# Patient Record
Sex: MALE | Race: WHITE | NOT HISPANIC OR LATINO | Employment: FULL TIME | URBAN - NONMETROPOLITAN AREA
[De-identification: names, ages, dates, MRNs, and addresses within clinical notes are randomized per-mention and may not be internally consistent; named-entity substitution may affect disease eponyms.]

---

## 2017-08-28 ENCOUNTER — TRANSFERRED RECORDS (OUTPATIENT)
Dept: HEALTH INFORMATION MANAGEMENT | Facility: HOSPITAL | Age: 27
End: 2017-08-28

## 2017-08-28 ENCOUNTER — MEDICAL CORRESPONDENCE (OUTPATIENT)
Dept: HEALTH INFORMATION MANAGEMENT | Facility: HOSPITAL | Age: 27
End: 2017-08-28

## 2017-09-06 DIAGNOSIS — H91.93 DECREASED HEARING, BILATERAL: Primary | ICD-10-CM

## 2017-09-15 ENCOUNTER — OFFICE VISIT (OUTPATIENT)
Dept: OTOLARYNGOLOGY | Facility: OTHER | Age: 27
End: 2017-09-15
Attending: PHYSICIAN ASSISTANT
Payer: COMMERCIAL

## 2017-09-15 ENCOUNTER — OFFICE VISIT (OUTPATIENT)
Dept: AUDIOLOGY | Facility: OTHER | Age: 27
End: 2017-09-15
Attending: AUDIOLOGIST
Payer: COMMERCIAL

## 2017-09-15 VITALS
TEMPERATURE: 97 F | HEART RATE: 87 BPM | SYSTOLIC BLOOD PRESSURE: 118 MMHG | DIASTOLIC BLOOD PRESSURE: 74 MMHG | BODY MASS INDEX: 35.89 KG/M2 | HEIGHT: 72 IN | WEIGHT: 265 LBS

## 2017-09-15 DIAGNOSIS — H69.91 DYSFUNCTION OF EUSTACHIAN TUBE, RIGHT: ICD-10-CM

## 2017-09-15 DIAGNOSIS — H90.11 CONDUCTIVE HEARING LOSS OF RIGHT EAR WITH UNRESTRICTED HEARING OF LEFT EAR: ICD-10-CM

## 2017-09-15 DIAGNOSIS — H65.91 RIGHT SEROUS OTITIS MEDIA, UNSPECIFIED CHRONICITY: Primary | ICD-10-CM

## 2017-09-15 DIAGNOSIS — Z86.69 HISTORY OF PERFORATION OF TYMPANIC MEMBRANE: ICD-10-CM

## 2017-09-15 DIAGNOSIS — H69.92 DYSFUNCTION OF EUSTACHIAN TUBE, LEFT: ICD-10-CM

## 2017-09-15 DIAGNOSIS — H90.2 CONDUCTIVE HEARING LOSS, UNILATERAL: ICD-10-CM

## 2017-09-15 DIAGNOSIS — H90.5 SENSORINEURAL HEARING LOSS (SNHL) OF LEFT EAR, UNSPECIFIED HEARING STATUS ON CONTRALATERAL SIDE: ICD-10-CM

## 2017-09-15 PROCEDURE — 99214 OFFICE O/P EST MOD 30 MIN: CPT | Mod: 25 | Performed by: PHYSICIAN ASSISTANT

## 2017-09-15 PROCEDURE — 92567 TYMPANOMETRY: CPT | Performed by: AUDIOLOGIST

## 2017-09-15 PROCEDURE — 92504 EAR MICROSCOPY EXAMINATION: CPT | Performed by: PHYSICIAN ASSISTANT

## 2017-09-15 PROCEDURE — 92557 COMPREHENSIVE HEARING TEST: CPT | Performed by: AUDIOLOGIST

## 2017-09-15 RX ORDER — LORATADINE 10 MG/1
10 TABLET ORAL DAILY
Qty: 30 TABLET | Refills: 1 | Status: SHIPPED | OUTPATIENT
Start: 2017-09-15 | End: 2017-11-22

## 2017-09-15 RX ORDER — FLUTICASONE PROPIONATE 50 MCG
SPRAY, SUSPENSION (ML) NASAL
Qty: 2 BOTTLE | Refills: 11 | Status: SHIPPED | OUTPATIENT
Start: 2017-09-15 | End: 2017-11-22

## 2017-09-15 ASSESSMENT — PAIN SCALES - GENERAL: PAINLEVEL: NO PAIN (0)

## 2017-09-15 NOTE — MR AVS SNAPSHOT
"              After Visit Summary   9/15/2017    Sudarshan [place]el[place]    MRN: [id number]           Patient Information     Date Of Birth          1990        Visit Information        Provider Department      9/15/2017 9:00 AM [facility]        Today's Diagnoses     Dysfunction of eustachian tube, left        Conductive hearing loss, unilateral           Follow-ups after your visit        Your next 10 appointments already scheduled     Sep 15, 2017  9:30 AM CDT   (Arrive by 9:15 AM)   New Visit with Sandra Farr PA-C   [facility] [place] ([facility] )    [address]  [place] [address]   [phone]              Who to contact     If you have questions or need follow up information about today's clinic visit or your schedule please contact [facility] directly at [phone].  Normal or non-critical lab and imaging results will be communicated to you by MyChart, letter or phone within 4 business days after the clinic has received the results. If you do not hear from us within 7 days, please contact the clinic through MyChart or phone. If you have a critical or abnormal lab result, we will notify you by phone as soon as possible.  Submit refill requests through Erydel or call your pharmacy and they will forward the refill request to us. Please allow 3 business days for your refill to be completed.          Additional Information About Your Visit        MyChart Information     Erydel lets you send messages to your doctor, view your test results, renew your prescriptions, schedule appointments and more. To sign up, go to [email or web address] . Click on \"Log in\" on the left side of the screen, which will take you to the Welcome page. Then click on \"Sign up Now\" on the right side of the page.     You will be asked to enter the access code listed below, as well as some personal information. Please follow the directions to create your " username and password.     Your access code is: [email or web address]  Expires: 2017  9:04 AM     Your access code will  in 90 days. If you need help or a new code, please call your [place] clinic or [phone].        Care EveryWhere ID     This is your Care EveryWhere ID. This could be used by other organizations to access your [place] medical records  [id number]         Blood Pressure from Last 3 Encounters:   No data found for BP    Weight from Last 3 Encounters:   No data found for Wt              We Performed the Following     AUDIOGRAM/TYMPANOGRAM - INTERFACE        Primary Care Provider    None Specified       No primary provider on file.        Equal Access to Services     [facility]: Hadii rafi Chavez, drarell kong, estefani kaalmada zahira, jj bucio . So [facility] [phone].    ATENCIÓN: Si habla español, tiene a davalos disposición servicios gratuitos de asistencia lingüística. Llame al [phone].    We comply with applicable federal civil rights laws and [place] laws. We do not discriminate on the basis of race, color, national origin, age, disability sex, sexual orientation or gender identity.            Thank you!     Thank you for choosing [facility]  for your care. Our goal is always to provide you with excellent care. Hearing back from our patients is one way we can continue to improve our services. Please take a few minutes to complete the written survey that you may receive in the mail after your visit with us. Thank you!             Your Updated Medication List - Protect others around you: Learn how to safely use, store and throw away your medicines at www.disposemymeds.org.      Notice  As of 9/15/2017  9:04 AM    You have not been prescribed any medications.

## 2017-09-15 NOTE — PROGRESS NOTES
Audiology Evaluation Completed. Please refer SCANNED AUDIOGRAM and/or TYMPANOGRAM for BACKGROUND, RESULTS, RECOMMENDATIONS.    UNDER RECOMMENDATIONS ON AUDIOGRAM PATIENT REFERRED TO ENT WITH SYMPTOMS      La Nena COLVIN, [facility]  Audiologist #[id number]        NO EPIC REFERRAL/ORDER NEEDED TO ENT BY AUDIOLOGY AS PATIENT ALREADY HAS AN APPOINTMENT WITH ENT

## 2017-09-15 NOTE — MR AVS SNAPSHOT
"              After Visit Summary   9/15/2017    Sudarshan Skelton    MRN: [id number]           Patient Information     Date Of Birth          1990        Visit Information        Provider Department      9/15/2017 9:30 AM Sandra Farr PA-C [facility]        Today's Diagnoses     Right serous otitis media, unspecified chronicity    -  1    Dysfunction of eustachian tube, right        History of perforation of tympanic membrane          Care Instructions    Start Flonase 2 sprays to each nostril twice a day for 2 weeks, then use daily  Start daily Claritin one tablet at night  Follow up in 3-4 weeks for recheck of right ear.   If effusion (fluid) or retraction remains, consider tube placement.     If there are concerns or questions, Call [phone] and ask for nurse           Follow-ups after your visit        Who to contact     If you have questions or need follow up information about today's clinic visit or your schedule please contact [facility] directly at [phone].  Normal or non-critical lab and imaging results will be communicated to you by Aditazzhart, letter or phone within 4 business days after the clinic has received the results. If you do not hear from us within 7 days, please contact the clinic through Plumzit or phone. If you have a critical or abnormal lab result, we will notify you by phone as soon as possible.  Submit refill requests through Vostu or call your pharmacy and they will forward the refill request to us. Please allow 3 business days for your refill to be completed.          Additional Information About Your Visit        AditazzharConfortVisuel Information     Vostu lets you send messages to your doctor, view your test results, renew your prescriptions, schedule appointments and more. To sign up, go to [email or web address] . Click on \"Log in\" on the left side of the screen, which will take you to the Welcome page. Then click on \"Sign up Now\" on the right side of the page. "     You will be asked to enter the access code listed below, as well as some personal information. Please follow the directions to create your username and password.     Your access code is: [email or web address]  Expires: 2017  9:04 AM     Your access code will  in 90 days. If you need help or a new code, please call your [place] clinic or [phone].        Care EveryWhere ID     This is your Care EveryWhere ID. This could be used by other organizations to access your [place] medical records  [id number]        Your Vitals Were     Pulse Temperature Height BMI (Body Mass Index)          87 97  F (36.1  C) (Tympanic) 6' (1.829 m) 35.94 kg/m2         Blood Pressure from Last 3 Encounters:   09/15/17 118/74    Weight from Last 3 Encounters:   09/15/17 265 lb (120.2 kg)              Today, you had the following     No orders found for display         Today's Medication Changes          These changes are accurate as of: 9/15/17  9:55 AM.  If you have any questions, ask your nurse or doctor.               Start taking these medicines.        Dose/Directions    fluticasone 50 MCG/ACT spray   Commonly known as:  FLONASE   Used for:  Right serous otitis media, unspecified chronicity, Dysfunction of eustachian tube, right, History of perforation of tympanic membrane   Started by:  Sandra Farr PA-C        Use 2 sprays to each nostril twice a day for 2 weeks, then daily.   Quantity:  2 Bottle   Refills:  11       loratadine 10 MG tablet   Commonly known as:  CLARITIN   Used for:  Right serous otitis media, unspecified chronicity, Dysfunction of eustachian tube, right, History of perforation of tympanic membrane   Started by:  Sandra Farr PA-C        Dose:  10 mg   Take 1 tablet (10 mg) by mouth daily   Quantity:  30 tablet   Refills:  1            Where to get your medicines      These medications were sent to [id number] - [place] - [address] [address]     Phone:  [phone]      fluticasone 50 MCG/ACT spray    loratadine 10 MG tablet                Primary Care Provider    None Specified       No primary provider on file.        Equal Access to Services     [facility] : Hadii aad ku hadzeniacatia Kathy, evetteda natalicrystalha, estefani rodriges, jj donnellin hayaaharshad dietrichaundrea yusuf rupinder powell. So [facility] [phone].    ATENCIÓN: Si habla español, tiene a davalos disposición servicios gratuitos de asistencia lingüística. Llame al [phone].    We comply with applicable federal civil rights laws and [place] laws. We do not discriminate on the basis of race, color, national origin, age, disability sex, sexual orientation or gender identity.            Thank you!     Thank you for choosing [facility]  for your care. Our goal is always to provide you with excellent care. Hearing back from our patients is one way we can continue to improve our services. Please take a few minutes to complete the written survey that you may receive in the mail after your visit with us. Thank you!             Your Updated Medication List - Protect others around you: Learn how to safely use, store and throw away your medicines at www.disposemymeds.org.          This list is accurate as of: 9/15/17  9:55 AM.  Always use your most recent med list.                   Brand Name Dispense Instructions for use Diagnosis    fluticasone 50 MCG/ACT spray    FLONASE    2 Bottle    Use 2 sprays to each nostril twice a day for 2 weeks, then daily.    Right serous otitis media, unspecified chronicity, Dysfunction of eustachian tube, right, History of perforation of tympanic membrane       loratadine 10 MG tablet    CLARITIN    30 tablet    Take 1 tablet (10 mg) by mouth daily    Right serous otitis media, unspecified chronicity, Dysfunction of eustachian tube, right, History of perforation of tympanic membrane

## 2017-09-15 NOTE — NURSING NOTE
Chief Complaint   Patient presents with     Consult     Hearing Loss, Tympanic Membrane Perforation; Referred by Regina       Initial /74 (Cuff Size: Adult Large)  Pulse 87  Temp 97  F (36.1  C) (Tympanic)  Ht 6' (1.829 m)  Wt 265 lb (120.2 kg)  BMI 35.94 kg/m2 Estimated body mass index is 35.94 kg/(m^2) as calculated from the following:    Height as of this encounter: 6' (1.829 m).    Weight as of this encounter: 265 lb (120.2 kg).  Medication Reconciliation: leelee Irwin

## 2017-09-15 NOTE — PROGRESS NOTES
Chief Complaint   Patient presents with     Consult     Hearing Loss, Tympanic Membrane Perforation; Referred by Regina     Sudarshan was on his honeymoon in [place], and was estela jumping. He was jumping multiple times, and noted increase in sensation with water feeling his ears. He went to [place], but his otorrhea stopped.   His hearing had not improved since the onset.   He was seen by his PCP in [place], and noted 2/3 perforation at his last visit.     He had COM as a child with BTT.   No recent OM.   He has no otalgia, otorrhea.   He has decreased hearing right. Left hearing is normal.   Sudarshan denies tinnitus.   Denies vertigo.   He has been keeping his ear dry.     Past Medical History:   Diagnosis Date     Exercise-induced asthma 2001      Not on File  No current outpatient prescriptions on file.     No current facility-administered medications for this visit.       ROS: 10 point ROS neg other than the symptoms noted above in the HPI.  /74 (Cuff Size: Adult Large)  Pulse 87  Temp 97  F (36.1  C) (Tympanic)  Ht 6' (1.829 m)  Wt 265 lb (120.2 kg)  BMI 35.94 kg/m2    General - The patient is well nourished and well developed, and appears to have good nutritional status.  Alert and oriented to person and place, answers questions and cooperates with examination appropriately.   Head and Face - Normocephalic and atraumatic, with no gross asymmetry noted.  The facial nerve is intact, with strong symmetric movements.  Voice and Breathing - The patient was breathing comfortably without the use of accessory muscles. There was no wheezing, stridor, or stertor.  The patients voice was clear and strong, and had appropriate pitch and quality.  Ears -The external auditory canals are patent, the tympanic membranes are intact. Left TM appears intact without effusion. Right TM does appear intact with central, anterior superior retraction. Air fluid level noted, serous.   Ears were examined under microscopy.   I do not  visualize a perforation  Eyes - Extraocular movements intact, and the pupils were reactive to light.  Sclera were not icteric or injected, conjunctiva were pink and moist.  Mouth - Examination of the oral cavity showed pink, healthy oral mucosa. No lesions or ulcerations noted.  The tongue was mobile and midline, and the dentition were in good condition.    Throat - The walls of the oropharynx were smooth, pink, moist, symmetric, and had no lesions or ulcerations.  The tonsillar pillars and soft palate were symmetric.  The uvula was midline on elevation.    Neck - Normal midline excursion of the laryngotracheal complex during swallowing.  Full range of motion on passive movement.  Palpation of the occipital, submental, submandibular, internal jugular chain, and supraclavicular nodes did not demonstrate any abnormal lymph nodes or masses.  Palpation of the thyroid was soft and smooth, with no nodules or goiter appreciated.  The trachea was mobile and midline.  Nose - External contour is symmetric, no gross deflection or scars.  Nasal mucosa is pink and moist with no abnormal mucus.     Audiogram  Type A left  Type B low volume left  Thresholds are mild rising to normal sloping to mild notch at 6000 Hz primarily SNHL left. Right moderate to moderate severe conductive hearing loss.       ASSESSMENT:    ICD-10-CM    1. Right serous otitis media, unspecified chronicity H65.91 fluticasone (FLONASE) 50 MCG/ACT spray     loratadine (CLARITIN) 10 MG tablet   2. Dysfunction of eustachian tube, right H69.81 fluticasone (FLONASE) 50 MCG/ACT spray     loratadine (CLARITIN) 10 MG tablet   3. History of perforation of tympanic membrane Z86.69 fluticasone (FLONASE) 50 MCG/ACT spray     loratadine (CLARITIN) 10 MG tablet   4. Conductive hearing loss of right ear with unrestricted hearing of left ear H90.11    5. Sensorineural hearing loss (SNHL) of left ear, unspecified hearing status on contralateral side H90.42     left- Mild loss  at notch at 6000 hz       Discussed with Sudarshan, I do not appreciate a perforation. However, he does have ETD, serous effusion. Will start Flonase BID for 2 weeks, Claritin daily  Follow up in 3-4 weeks. He may wish to consider tube placement or myringotomy. Patient wishes to avoid procedure if able.     Briefly reviewed procedure with patient.   Reassured no perforation noted on exam which would agree with his low volume, Type B tympanogram today.  He agrees with this plan      This can occur in patients after an upper respiratory infection, as a complication of allergies, after altitude changes, as a sequelae of nasal anatomic abnormalities or as part of an genetic tendency.  Eustachian tube exercises were demonstrated and can be helpful.  A brochure on ears and altitude was given and reviewed as well.  Medical management can be in the form of decongestants used temporarily and with an understanding of side effects to blood pressure and prostate problems.  Nasal steroids can be of benefit and may take a few week to obtain maximum benefit.  Antihistamines are another option.    If problems persist radiographic work-up for sinus problems can include a CT scan of the paranasal sinuses looking for anatomic abnormalities.  Sometimes patients benefit from a complete allergy work-up too.      Thank you for allowing me to participate in the care of your patient.     Sandra Farr PA-C  ENT  [facility] [place]  [phone]

## 2017-09-15 NOTE — PATIENT INSTRUCTIONS
Start Flonase 2 sprays to each nostril twice a day for 2 weeks, then use daily  Start daily Claritin one tablet at night  Follow up in 3-4 weeks for recheck of right ear.   If effusion (fluid) or retraction remains, consider tube placement.     If there are concerns or questions, Call [phone] and ask for nurse

## 2017-10-23 ENCOUNTER — OFFICE VISIT (OUTPATIENT)
Dept: OTOLARYNGOLOGY | Facility: OTHER | Age: 27
End: 2017-10-23
Attending: OTOLARYNGOLOGY
Payer: COMMERCIAL

## 2017-10-23 VITALS
DIASTOLIC BLOOD PRESSURE: 72 MMHG | HEART RATE: 82 BPM | HEIGHT: 72 IN | TEMPERATURE: 98.4 F | SYSTOLIC BLOOD PRESSURE: 120 MMHG | WEIGHT: 265 LBS | BODY MASS INDEX: 35.89 KG/M2 | OXYGEN SATURATION: 95 %

## 2017-10-23 DIAGNOSIS — Z96.22 S/P MYRINGOTOMY WITH INSERTION OF TUBE: Primary | ICD-10-CM

## 2017-10-23 PROCEDURE — 99213 OFFICE O/P EST LOW 20 MIN: CPT | Mod: 25 | Performed by: OTOLARYNGOLOGY

## 2017-10-23 PROCEDURE — 69433 CREATE EARDRUM OPENING: CPT | Performed by: OTOLARYNGOLOGY

## 2017-10-23 RX ORDER — CIPROFLOXACIN AND DEXAMETHASONE 3; 1 MG/ML; MG/ML
4 SUSPENSION/ DROPS AURICULAR (OTIC) 2 TIMES DAILY
Qty: 7.5 ML | Refills: 0 | Status: SHIPPED | OUTPATIENT
Start: 2017-10-23 | End: 2017-10-30

## 2017-10-23 ASSESSMENT — PAIN SCALES - GENERAL: PAINLEVEL: NO PAIN (0)

## 2017-10-23 NOTE — MR AVS SNAPSHOT
After Visit Summary   10/23/2017    Sudarshan Skelton    MRN: [id number]           Patient Information     Date Of Birth          1990        Visit Information        Provider Department      10/23/2017 9:15 AM Paulette Guerrier MD [facility]        Care Instructions    Thank you for allowing Dr. Guerrier and our ENT team to participate in your care.  If you have a scheduling or an appointment question please contact [facility] Unit Coordinator at their direct line [phone].   ALL nursing questions or concerns can be directed to your ENT nurse at: [phone] - Edda    Follow up for an Audiogram in 1 month and see HANY Eduardo after      Instructions for Myringotomy Tubes ( Ear Tubes)    Recovery - The placement of ear tubes is a brief operation, and therefore the recovery from the anesthetic is usually less than a day.  However, in young children the sleep patterns, feeding, and behavior can be altered for several days.  Try to return to the daily routine as soon as possible and this issue will resolve without problems.  There are no restrictions to diet or activity after ear tube placement.    Medications - Children and adults can return to all preoperative medications after this procedure, including blood thinners.  You were sent home with ear drops, please use them as directed to assist in the rapid healing of the ear drum around the tube.  Pain medication may have been sent home with you, but a vast majority of the time, over the counter Tylenol or ibuprofen (advil) I sufficient. Finish prescription ear drops (4 drops twice a day).     Complications - A low grade fever (up to 100 degrees ) is not unusual in the day after tubes are placed.  Treat this with cool wash cloths to the forehead and Tylenol.  If the fever is higher, or does not respond to medication, call the Doctor s office or call service after hours.  A small amount of bloody drainage can occur for a  day or two after ear tubes, and is perfectly normal, continue the ear drops as directed and it will clear up.    Water Precautions - Recent clinical research has shown that absolute water precautions are not always necessary.  Ear plugs or water head bands are not necessary unless the ear is actively draining, or if your child does not like the sensation of water in the ear.    Follow up - Approximately 1 month after the tubes are placed I like to examine the ears to make sure there are no signs of complications, which are extremely rare.  You should already have an appointment in 1 month with ENT PA and audiology.  If not, call our office at [phone].  In some unusual cases the ears  reject  the tubes.  Depending on the situation, a hearing test may or may not be performed at that time.  Afterwards, follow up is done every 6 months, but of course earlier if there are any issues or problems.    Advantages of Tubes - After ear tube placement, there are certain benefits from having a direct communication of the middle ear space with the ear canal.  In the event of drainage from the ears with ear tubes in place ( which is common with colds and flus ) use the ear drops you were discharged home with using the same dosage and instructions.  This will clear up the ears without the need for oral antibiotics a majority of the time.  Another advantage is that with tubes in place, the ears automatically adjust to changes in atmospheric pressure ( such as in airplanes or elevation ).  In other words, if the tubes are open the ears will not hurt or pop!            Follow-ups after your visit        Follow-up notes from your care team     Return in about 4 weeks (around 11/20/2017).      Who to contact     If you have questions or need follow up information about today's clinic visit or your schedule please contact [facility] directly at [phone].  Normal or non-critical lab and imaging results will be  "communicated to you by Purple Labshart, letter or phone within 4 business days after the clinic has received the results. If you do not hear from us within 7 days, please contact the clinic through Coveroo or phone. If you have a critical or abnormal lab result, we will notify you by phone as soon as possible.  Submit refill requests through Coveroo or call your pharmacy and they will forward the refill request to us. Please allow 3 business days for your refill to be completed.          Additional Information About Your Visit        Coveroo Information     Coveroo lets you send messages to your doctor, view your test results, renew your prescriptions, schedule appointments and more. To sign up, go to [email or web address] . Click on \"Log in\" on the left side of the screen, which will take you to the Welcome page. Then click on \"Sign up Now\" on the right side of the page.     You will be asked to enter the access code listed below, as well as some personal information. Please follow the directions to create your username and password.     Your access code is: [email or web address]  Expires: 2017  9:04 AM     Your access code will  in 90 days. If you need help or a new code, please call your [place] clinic or [phone].        Care EveryWhere ID     This is your Care EveryWhere ID. This could be used by other organizations to access your [place] medical records  [id number]        Your Vitals Were     Pulse Temperature Height Pulse Oximetry BMI (Body Mass Index)       82 98.4  F (36.9  C) (Tympanic) 6' (1.829 m) 95% 35.94 kg/m2        Blood Pressure from Last 3 Encounters:   10/23/17 120/72   09/15/17 118/74    Weight from Last 3 Encounters:   10/23/17 265 lb (120.2 kg)   09/15/17 265 lb (120.2 kg)              Today, you had the following     No orders found for display       Primary Care Provider    Physician No Ref-Primary       NO REF-PRIMARY PHYSICIAN        Equal Access to Services     ALLEY PEREZ AH: Hadii " rafi Chavez, wamichaelda luqadaha, qaybta kaalmada zahira, waxelaina hugo longojuanyfritz hernandezKanubrandon andre. So [facility] [phone].    ATENCIÓN: Si habla español, tiene a davalos disposición servicios gratuitos de asistencia lingüística. Jesusame al [phone].    We comply with applicable federal civil rights laws and [place] laws. We do not discriminate on the basis of race, color, national origin, age, disability, sex, sexual orientation, or gender identity.            Thank you!     Thank you for choosing [facility]  for your care. Our goal is always to provide you with excellent care. Hearing back from our patients is one way we can continue to improve our services. Please take a few minutes to complete the written survey that you may receive in the mail after your visit with us. Thank you!             Your Updated Medication List - Protect others around you: Learn how to safely use, store and throw away your medicines at www.disposemymeds.org.          This list is accurate as of: 10/23/17  9:43 AM.  Always use your most recent med list.                   Brand Name Dispense Instructions for use Diagnosis    fluticasone 50 MCG/ACT spray    FLONASE    2 Bottle    Use 2 sprays to each nostril twice a day for 2 weeks, then daily.    Right serous otitis media, unspecified chronicity, Dysfunction of Eustachian tube, right, History of perforation of tympanic membrane       loratadine 10 MG tablet    CLARITIN    30 tablet    Take 1 tablet (10 mg) by mouth daily    Right serous otitis media, unspecified chronicity, Dysfunction of Eustachian tube, right, History of perforation of tympanic membrane

## 2017-10-23 NOTE — PROGRESS NOTES
Otolaryngology Progress Note      History of Present Illness   Patient presents with:  Ear Problem: Follow up Right ETD, Hx of Tm perforation      Sudarshan Skelton is a 27 year old male   presents back for right hearing loss    He notes a plugged right ear  No otorrhea since July when in [place] after jumping in [place]    Alex 9/15 note reviewed:    Sudarshan was on his [place]ymoon in [place], and was estela jumping. He was jumping multiple times, and noted increase in sensation with water feeling his ears. He went to [place], but his otorrhea stopped.   His hearing had not improved since the onset.   He was seen by his PCP in [place], and noted 2/3 perforation at his last visit.      He had COM as a child with BTT.   No recent OM.   He has no otalgia, otorrhea.   He has decreased hearing right. Left hearing is normal.   Sudarshan denies tinnitus.   Denies vertigo.   He has been keeping his ear dry.     Audiogram reviewed with Sudarshan 9/15:  Moderate to moderate-severe right  Primarily CHL  Slightly larger volume flat B tymp R, A left  SRT 35 dB R  10 dB L    Physical Exam  /72  Pulse 82  Temp 98.4  F (36.9  C) (Tympanic)  Ht 6' (1.829 m)  Wt 265 lb (120.2 kg)  SpO2 95%  BMI 35.94 kg/m2  General - The patient is well nourished and well developed, and appears to have good nutritional status.  Alert and oriented to person and place, interactive.  Head and Face - Normocephalic and atraumatic, with no gross asymmetry noted of the contour of the facial features.  The facial nerve is intact, with strong symmetric movements.  Neck-no palpable lymphadenopathy or thyroid mass.  Trachea is midline.  Eyes - Extraocular movements intact.   Ears- External auditory canals are patent, right tympanic membrane is intact with serous effusion and anterior retraction, no worrisome retractions   Left TM intact without effusion or retraction    RIGHT Myringotomy with Tube Placement    Procedure - I discussed the risks and complications of  RIGHT  tympanostomy tube insertion  Including topical anesthesia, bleeding, infection, change in hearing or hearing loss, tympanic membrane perforation, need for additional surgery, chronic ear drainage, tube occlusion or need for tube reinsertion, cholesteatoma.   All questions were answered and the patient/and or guardian wishes are to proceed with surgical intervention.   After discussion of the risks and benefits of myringotomy.    I proceeded to position the patient in a supine position in the examination chair.  Using the binocular surgical microscope, I then proceeded to clean the  RIGHT canal of cerumen and squamous debris.  I was able to see the tympanic membrane.  Using a small cotton tipped applicator, I applied a tiny coating of phenol onto the tympanic membrane.  After visualizing a good debra, I then proceeded to use a myringotomy knife to make a radially oriented incision in the tympanic membrane.  Serous effusion noted.  Serous effusion removed with 5 and 3 suction. I irrigated and suctioned the ear gently.   Next, I proceeded to place a 1.27 mm duravent tube through the incision.  After confirming good positioning and a clearly visible open tube, otic drops were applied and a cotton ball was inserted into the canal.      Impression/Plan  1.  chronic otitis media with effusion   2.  eustachian tube dysfunction  3.  conductive hearing loss    Ciprodex drops for 7 days.  Follow up audiogram in 1 month  See EVERARDO Hughes P.A. adam John NP  in 1 month then annual tube check        Verify flex nasopharyngoscopy performed, if not and effusion recurs will need nasopharyngoscopy      Paulette Guerrier D.O.  Otolaryngology/Head and Neck Surgery  Allergy

## 2017-10-23 NOTE — NURSING NOTE
Chief Complaint   Patient presents with     Ear Problem     Follow up Right ETD, Hx of Tm perforation       Initial /72  Pulse 82  Temp 98.4  F (36.9  C) (Tympanic)  Ht 6' (1.829 m)  Wt 265 lb (120.2 kg)  SpO2 95%  BMI 35.94 kg/m2 Estimated body mass index is 35.94 kg/(m^2) as calculated from the following:    Height as of this encounter: 6' (1.829 m).    Weight as of this encounter: 265 lb (120.2 kg).  Medication Reconciliation: complete     Nelly Cat

## 2017-10-23 NOTE — PATIENT INSTRUCTIONS
Thank you for allowing Dr. Guerrier and our ENT team to participate in your care.  If you have a scheduling or an appointment question please contact [facility] Unit Coordinator at their direct line [phone].   ALL nursing questions or concerns can be directed to your ENT nurse at: [phone] Oscar Bañuelos    Follow up for an Audiogram in 1 month and see HANY Eduardo after      Instructions for Myringotomy Tubes ( Ear Tubes)    Recovery - The placement of ear tubes is a brief operation, and therefore the recovery from the anesthetic is usually less than a day.  However, in young children the sleep patterns, feeding, and behavior can be altered for several days.  Try to return to the daily routine as soon as possible and this issue will resolve without problems.  There are no restrictions to diet or activity after ear tube placement.    Medications - Children and adults can return to all preoperative medications after this procedure, including blood thinners.  You were sent home with ear drops, please use them as directed to assist in the rapid healing of the ear drum around the tube.  Pain medication may have been sent home with you, but a vast majority of the time, over the counter Tylenol or ibuprofen (advil) I sufficient. Finish prescription ear drops (4 drops twice a day).     Complications - A low grade fever (up to 100 degrees ) is not unusual in the day after tubes are placed.  Treat this with cool wash cloths to the forehead and Tylenol.  If the fever is higher, or does not respond to medication, call the Doctor s office or call service after hours.  A small amount of bloody drainage can occur for a day or two after ear tubes, and is perfectly normal, continue the ear drops as directed and it will clear up.    Water Precautions - Recent clinical research has shown that absolute water precautions are not always necessary.  Ear plugs or water head bands are not necessary unless the ear is actively  draining, or if your child does not like the sensation of water in the ear.    Follow up - Approximately 1 month after the tubes are placed I like to examine the ears to make sure there are no signs of complications, which are extremely rare.  You should already have an appointment in 1 month with ENT PA and audiology.  If not, call our office at [phone].  In some unusual cases the ears  reject  the tubes.  Depending on the situation, a hearing test may or may not be performed at that time.  Afterwards, follow up is done every 6 months, but of course earlier if there are any issues or problems.    Advantages of Tubes - After ear tube placement, there are certain benefits from having a direct communication of the middle ear space with the ear canal.  In the event of drainage from the ears with ear tubes in place ( which is common with colds and flus ) use the ear drops you were discharged home with using the same dosage and instructions.  This will clear up the ears without the need for oral antibiotics a majority of the time.  Another advantage is that with tubes in place, the ears automatically adjust to changes in atmospheric pressure ( such as in airplanes or elevation ).  In other words, if the tubes are open the ears will not hurt or pop!

## 2017-11-07 DIAGNOSIS — H91.93 DECREASED HEARING OF BOTH EARS: Primary | ICD-10-CM

## 2017-11-22 ENCOUNTER — OFFICE VISIT (OUTPATIENT)
Dept: AUDIOLOGY | Facility: OTHER | Age: 27
End: 2017-11-22
Attending: AUDIOLOGIST
Payer: COMMERCIAL

## 2017-11-22 ENCOUNTER — OFFICE VISIT (OUTPATIENT)
Dept: OTOLARYNGOLOGY | Facility: OTHER | Age: 27
End: 2017-11-22
Attending: PHYSICIAN ASSISTANT
Payer: COMMERCIAL

## 2017-11-22 VITALS
SYSTOLIC BLOOD PRESSURE: 120 MMHG | DIASTOLIC BLOOD PRESSURE: 70 MMHG | TEMPERATURE: 96.7 F | BODY MASS INDEX: 35.21 KG/M2 | HEART RATE: 95 BPM | WEIGHT: 260 LBS | HEIGHT: 72 IN

## 2017-11-22 DIAGNOSIS — H69.91 DYSFUNCTION OF EUSTACHIAN TUBE, RIGHT: ICD-10-CM

## 2017-11-22 DIAGNOSIS — H69.91 DYSFUNCTION OF RIGHT EUSTACHIAN TUBE: Primary | ICD-10-CM

## 2017-11-22 DIAGNOSIS — Z96.22 S/P MYRINGOTOMY WITH INSERTION OF TUBE: Primary | ICD-10-CM

## 2017-11-22 PROCEDURE — 92556 SPEECH AUDIOMETRY COMPLETE: CPT | Performed by: AUDIOLOGIST

## 2017-11-22 PROCEDURE — 92552 PURE TONE AUDIOMETRY AIR: CPT | Mod: 52 | Performed by: AUDIOLOGIST

## 2017-11-22 PROCEDURE — 92567 TYMPANOMETRY: CPT | Performed by: AUDIOLOGIST

## 2017-11-22 PROCEDURE — 99024 POSTOP FOLLOW-UP VISIT: CPT | Performed by: PHYSICIAN ASSISTANT

## 2017-11-22 ASSESSMENT — PAIN SCALES - GENERAL: PAINLEVEL: NO PAIN (0)

## 2017-11-22 NOTE — PATIENT INSTRUCTIONS
Hearing greatly improved.   Tube is in good position and open.   Follow up in 6 months for tube check     Thank you for allowing Sandra Farr PA-C  and our ENT team to participate in your care.  If you have a scheduling or an appointment question please contact [facility] Unit Coordinator at their direct line [phone].   ALL nursing questions or concerns can be directed to your ENT nurse at: [phone] Anne

## 2017-11-22 NOTE — PROGRESS NOTES
Audiology Evaluation Completed. Please refer SCANNED AUDIOGRAM and/or TYMPANOGRAM for BACKGROUND, RESULTS, RECOMMENDATIONS.    La Nena Lucero M.S., [facility]  Audiologist #[id number]

## 2017-11-22 NOTE — PROGRESS NOTES
Chief Complaint   Patient presents with     Surgical Followup     Pt is s/p right tube insertion in office on 10/23/17.  Pt states that his ear is doing a lot better.     Patient is doing well.   He had right tube placed due to serous OM. Since, his ear feels improved. Aural fullness and pressure resolved. No otorrhea. No otalgia.   Hearing improved, resolution of CHL.     Past Medical History:   Diagnosis Date     Exercise-induced asthma 2001      No Known Allergies  No current outpatient prescriptions on file.     No current facility-administered medications for this visit.       ROS: 10 point ROS neg other than the symptoms noted above in the HPI.  /90 (BP Location: Right arm, Cuff Size: Adult Large)  Pulse 95  Temp 96.7  F (35.9  C) (Tympanic)  Ht 6' (1.829 m)  Wt 260 lb (117.9 kg)  BMI 35.26 kg/m2  This is a 27 year old patient    General Appearance:  healthy, alert, active and no distress  Head: Normocephalic. No masses, lesions, tenderness or abnormalities  Eyes: conjuctiva clear, PERRL, EOM intact  Ears: External ears normal. Canals clear. TM's normal.,  Right TM with patent Duravent tube.   Nose: Nares normal  Mouth: normal  Neck: Supple, no cervical adenopathy, no thyromegaly, Full range of motion in all planes      The lips appear normal and without lesion.  The dentition is  in good condition  The patient has a normal appearing and functioning hard and soft palate without bifid uvula or submucosal cleft.  The tongue is normal in appearance without erosive lesion or fungating mass.  The oral mucosa is soft and normal in appearance.  The patient also has a soft floor of mouth and base of tongue.      ASSESSMENT:    ICD-10-CM    1. S/P myringotomy with insertion of tube Z96.22    2. Dysfunction of Eustachian tube, right H69.81      Hearing improved to overall normal range, slight loss high frequency  Tube reviewed w/ patient.   Follow up in 6 months  BP was rechecked, decreased to 120/70s.        He is happy with this tube placement.       Sandra Farr PA-C  ENT  [facility] [place] [place]  [phone]

## 2017-11-22 NOTE — NURSING NOTE
Chief Complaint   Patient presents with     Surgical Followup     Pt is s/p right tube insertion in office on 10/23/17.  Pt states that his ear is doing a lot better.       Initial /90 (BP Location: Right arm, Cuff Size: Adult Large)  Pulse 95  Temp 96.7  F (35.9  C) (Tympanic)  Ht 6' (1.829 m)  Wt 260 lb (117.9 kg)  BMI 35.26 kg/m2 Estimated body mass index is 35.26 kg/(m^2) as calculated from the following:    Height as of this encounter: 6' (1.829 m).    Weight as of this encounter: 260 lb (117.9 kg).  Medication Reconciliation: complete   Anne Banerjee LPN

## 2017-11-22 NOTE — MR AVS SNAPSHOT
After Visit Summary   11/22/2017    Sudarshan Skelton    MRN: [id number]           Patient Information     Date Of Birth          1990        Visit Information        Provider Department      11/22/2017 11:00 AM Sandra Farr PA-C [facility]        Care Instructions    Hearing greatly improved.   Tube is in good position and open.   Follow up in 6 months for tube check     Thank you for allowing Sandra Farr PA-C  and our ENT team to participate in your care.  If you have a scheduling or an appointment question please contact [facility] Unit Coordinator at their direct line [phone].   ALL nursing questions or concerns can be directed to your ENT nurse at: [phone] [facility]              Follow-ups after your visit        Follow-up notes from your care team     Return in about 6 months (around 5/22/2018).      Your next 10 appointments already scheduled     Nov 22, 2017 11:00 AM CST   (Arrive by 10:45 AM)   Return Visit with Sandra Farr PA-C   [facility] [place] ([facility] [place] )    [address]  [place] [address]   [phone]              Who to contact     If you have questions or need follow up information about today's clinic visit or your schedule please contact [facility] directly at [phone].  Normal or non-critical lab and imaging results will be communicated to you by MyChart, letter or phone within 4 business days after the clinic has received the results. If you do not hear from us within 7 days, please contact the clinic through MyChart or phone. If you have a critical or abnormal lab result, we will notify you by phone as soon as possible.  Submit refill requests through [employer] or call your pharmacy and they will forward the refill request to us. Please allow 3 business days for your refill to be completed.          Additional Information About Your Visit        [employer] Information     [employer] lets you send messages to your  "doctor, view your test results, renew your prescriptions, schedule appointments and more. To sign up, go to [email or web address] . Click on \"Log in\" on the left side of the screen, which will take you to the Welcome page. Then click on \"Sign up Now\" on the right side of the page.     You will be asked to enter the access code listed below, as well as some personal information. Please follow the directions to create your username and password.     Your access code is: [email or web address]  Expires: 2017  8:04 AM     Your access code will  in 90 days. If you need help or a new code, please call your [facility] clinic or [phone].        Care EveryWhere ID     This is your Care EveryWhere ID. This could be used by other organizations to access your [facility] medical records  [id number]        Your Vitals Were     Pulse Temperature Height BMI (Body Mass Index)          95 96.7  F (35.9  C) (Tympanic) 6' (1.829 m) 35.26 kg/m2         Blood Pressure from Last 3 Encounters:   17 120/90   10/23/17 120/72   09/15/17 118/74    Weight from Last 3 Encounters:   17 260 lb (117.9 kg)   10/23/17 265 lb (120.2 kg)   09/15/17 265 lb (120.2 kg)              Today, you had the following     No orders found for display       Primary Care Provider    Physician No Ref-Primary       NO REF-PRIMARY PHYSICIAN        Equal Access to Services     [facility]: Hadii aad ku hadasho Soomaali, waaxda luqadaha, qaybta kaalmada adeegyada, jj bucio . So [facility] [phone].    ATENCIÓN: Si habla español, tiene a davalos disposición servicios gratuitos de asistencia lingüística. Llame al [phone].    We comply with applicable federal civil rights laws and [place] laws. We do not discriminate on the basis of race, color, national origin, age, disability, sex, sexual orientation, or gender identity.            Thank you!     Thank you for choosing [facility]  for your care. Our goal " is always to provide you with excellent care. Hearing back from our patients is one way we can continue to improve our services. Please take a few minutes to complete the written survey that you may receive in the mail after your visit with us. Thank you!             Your Updated Medication List - Protect others around you: Learn how to safely use, store and throw away your medicines at www.disposemymeds.org.      Notice  As of 11/22/2017 10:49 AM    You have not been prescribed any medications.

## 2017-11-22 NOTE — MR AVS SNAPSHOT
"              After Visit Summary   11/22/2017    Sudarshan Skelton    MRN: [id number]           Patient Information     Date Of Birth          1990        Visit Information        Provider Department      11/22/2017 10:30 AM La Nena Lucero [facility]        Today's Diagnoses     Dysfunction of right eustachian tube    -  1       Follow-ups after your visit        Your next 10 appointments already scheduled     Nov 22, 2017 11:00 AM CST   (Arrive by 10:45 AM)   Return Visit with Sandra Farr PA-C   [facility] [place] ([facility] [place] )    [address]  [place] [address]   [phone]              Who to contact     If you have questions or need follow up information about today's clinic visit or your schedule please contact [facility] directly at [phone].  Normal or non-critical lab and imaging results will be communicated to you by MyChart, letter or phone within 4 business days after the clinic has received the results. If you do not hear from us within 7 days, please contact the clinic through MyChart or phone. If you have a critical or abnormal lab result, we will notify you by phone as soon as possible.  Submit refill requests through [employer] or call your pharmacy and they will forward the refill request to us. Please allow 3 business days for your refill to be completed.          Additional Information About Your Visit        MyChart Information     [employer] lets you send messages to your doctor, view your test results, renew your prescriptions, schedule appointments and more. To sign up, go to [email or web address] . Click on \"Log in\" on the left side of the screen, which will take you to the Welcome page. Then click on \"Sign up Now\" on the right side of the page.     You will be asked to enter the access code listed below, as well as some personal information. Please follow the directions to create your username and password.     Your access " code is: [email or web address]  Expires: 2017  8:04 AM     Your access code will  in 90 days. If you need help or a new code, please call your [facility] clinic or [phone].        Care EveryWhere ID     This is your Care EveryWhere ID. This could be used by other organizations to access your [facility] medical records  [id number]         Blood Pressure from Last 3 Encounters:   10/23/17 120/72   09/15/17 118/74    Weight from Last 3 Encounters:   10/23/17 265 lb (120.2 kg)   09/15/17 265 lb (120.2 kg)              We Performed the Following     AUDIOGRAM/TYMPANOGRAM - INTERFACE        Primary Care Provider    Physician No Ref-Primary       NO REF-PRIMARY PHYSICIAN        Equal Access to Services     [facility] PERE[facility] : Hadii aad ku hadasho Soomaali, waaxda luqadaha, qaybta kaalmada adeegyada, jj bucio . So [facility] [phone].    ATENCIÓN: Si habla español, tiene a davalos disposición servicios gratuitos de asistencia lingüística. Llame al [phone].    We comply with applicable federal civil rights laws and [place] laws. We do not discriminate on the basis of race, color, national origin, age, disability, sex, sexual orientation, or gender identity.            Thank you!     Thank you for choosing [facility]  for your [facility]. Our goal is always to provide you with excellent care. Hearing back from our patients is one way we can continue to improve our services. Please take a few minutes to complete the written survey that you may receive in the mail after your visit with us. Thank you!             Your Updated Medication List - Protect others around you: Learn how to safely use, store and throw away your medicines at www.disposemymeds.org.          This list is accurate as of: 17 10:38 AM.  Always use your most recent med list.                   Brand Name Dispense Instructions for use Diagnosis    loratadine 10 MG tablet    CLARITIN    30 tablet    Take 1 tablet (10  mg) by mouth daily    Right serous otitis media, unspecified chronicity, Dysfunction of Eustachian tube, right, History of perforation of tympanic membrane

## 2018-09-19 ENCOUNTER — OFFICE VISIT (OUTPATIENT)
Dept: OTOLARYNGOLOGY | Facility: OTHER | Age: 28
End: 2018-09-19
Attending: NURSE PRACTITIONER
Payer: COMMERCIAL

## 2018-09-19 VITALS
HEIGHT: 72 IN | TEMPERATURE: 97.3 F | DIASTOLIC BLOOD PRESSURE: 74 MMHG | OXYGEN SATURATION: 98 % | WEIGHT: 260 LBS | BODY MASS INDEX: 35.21 KG/M2 | HEART RATE: 83 BPM | SYSTOLIC BLOOD PRESSURE: 122 MMHG

## 2018-09-19 DIAGNOSIS — H73.891 RETRACTION OF TYMPANIC MEMBRANE, RIGHT: Primary | ICD-10-CM

## 2018-09-19 DIAGNOSIS — H69.91 ETD (EUSTACHIAN TUBE DYSFUNCTION), RIGHT: ICD-10-CM

## 2018-09-19 DIAGNOSIS — H93.8X1 PLUGGED FEELING IN EAR, RIGHT: ICD-10-CM

## 2018-09-19 DIAGNOSIS — H65.491 CHRONIC MIDDLE EAR EFFUSION, RIGHT: ICD-10-CM

## 2018-09-19 PROCEDURE — 92504 EAR MICROSCOPY EXAMINATION: CPT | Performed by: NURSE PRACTITIONER

## 2018-09-19 PROCEDURE — 99213 OFFICE O/P EST LOW 20 MIN: CPT | Mod: 25 | Performed by: NURSE PRACTITIONER

## 2018-09-19 RX ORDER — FLUTICASONE PROPIONATE 50 MCG
2 SPRAY, SUSPENSION (ML) NASAL DAILY
Qty: 1 BOTTLE | Refills: 11 | Status: SHIPPED | OUTPATIENT
Start: 2018-09-19 | End: 2021-03-01

## 2018-09-19 ASSESSMENT — PAIN SCALES - GENERAL: PAINLEVEL: NO PAIN (0)

## 2018-09-19 NOTE — MR AVS SNAPSHOT
After Visit Summary   9/19/2018    Sudarshan Skelton    MRN: [id number]           Patient Information     Date Of Birth          1990        Visit Information        Provider Department      9/19/2018 8:45 AM Brittani John APRN CNP [facility]        Today's Diagnoses     Retraction of tympanic membrane, right    -  1    ETD (Eustachian tube dysfunction), right        Plugged feeling in ear, right          Care Instructions    Flonase 2 sprays to each nostril once daily.    'pop' ear 3-4 times daily.    Follow up audiogram and see Dr. Guerrier for consideration of right tube.      Thank you for allowing Brittani John CNP and our ENT team to participate in your care.  If your medications are too expensive, please give the nurse a call.  We can possibly change this medication.  If you have a scheduling or an appointment question please contact [facility] Unit Coordinator at their direct line [phone].   ALL nursing questions or concerns can be directed to your ENT nurse at: [phone]- alex            Follow-ups after your visit        Follow-up notes from your care team     Return for f/u audiogram and Dr. Guerrier ? right tube.      Who to contact     If you have questions or need follow up information about today's clinic visit or your schedule please contact [facility] directly at [phone].  Normal or non-critical lab and imaging results will be communicated to you by MyChart, letter or phone within 4 business days after the clinic has received the results. If you do not hear from us within 7 days, please contact the clinic through MyChart or phone. If you have a critical or abnormal lab result, we will notify you by phone as soon as possible.  Submit refill requests through Amiare or call your pharmacy and they will forward the refill request to us. Please allow 3 business days for your refill to be completed.           Additional Information About Your Visit        Care EveryWhere ID     This is your Care EveryWhere ID. This could be used by other organizations to access your [place] medical records  [id number]        Your Vitals Were     Pulse Temperature Height Pulse Oximetry BMI (Body Mass Index)       83 97.3  F (36.3  C) (Tympanic) 6' (1.829 m) 98% 35.26 kg/m2        Blood Pressure from Last 3 Encounters:   09/19/18 122/74   11/22/17 120/70   10/23/17 120/72    Weight from Last 3 Encounters:   09/19/18 260 lb (117.9 kg)   11/22/17 260 lb (117.9 kg)   10/23/17 265 lb (120.2 kg)              Today, you had the following     No orders found for display         Today's Medication Changes          These changes are accurate as of 9/19/18  9:03 AM.  If you have any questions, ask your nurse or doctor.               Start taking these medicines.        Dose/Directions    fluticasone 50 MCG/ACT spray   Commonly known as:  FLONASE   Used for:  Retraction of tympanic membrane, right, ETD (Eustachian tube dysfunction), right, Plugged feeling in ear, right   Started by:  Brittani John, CLEM CNP        Dose:  2 spray   Spray 2 sprays into both nostrils daily   Quantity:  1 Bottle   Refills:  11            Where to get your medicines      These medications were sent to [id number] - [place], [place] - [address]   [id number] , [place] [address]     Phone:  [phone]     fluticasone 50 MCG/ACT spray                Primary Care Provider Fax #    Physician No Ref-Primary [phone]       No address on file        Equal Access to Services     [facility]: Hadii rafi lizarraga Sonoé, waaxda luqadaha, qaybta kaalmada kevinyada, jj powell. So [facility] [phone].    ATENCIÓN: Si habla español, tiene a davalos disposición servicios gratuitos de asistencia lingüística. Llame al [phone].    We comply with applicable federal civil rights laws and [place] laws. We do not discriminate on the  basis of race, color, national origin, age, disability, sex, sexual orientation, or gender identity.            Thank you!     Thank you for choosing [facility]  for your care. Our goal is always to provide you with excellent care. Hearing back from our patients is one way we can continue to improve our services. Please take a few minutes to complete the written survey that you may receive in the mail after your visit with us. Thank you!             Your Updated Medication List - Protect others around you: Learn how to safely use, store and throw away your medicines at www.disposemymeds.org.          This list is accurate as of 9/19/18  9:03 AM.  Always use your most recent med list.                   Brand Name Dispense Instructions for use Diagnosis    fluticasone 50 MCG/ACT spray    FLONASE    1 Bottle    Spray 2 sprays into both nostrils daily    Retraction of tympanic membrane, right, ETD (Eustachian tube dysfunction), right, Plugged feeling in ear, right

## 2018-09-19 NOTE — LETTER
9/19/2018         RE: Sudarshan Skelton  [address]  [place] [address]        Dear Colleague,    Thank you for referring your patient, Sudarshan Skelton, to the [facility]. Please see a copy of my visit note below.    Otolaryngology Progress Note           Chief Complaint:     Patient presents with:  RECHECK PE TUBES: S/P Myringotomy 10/23/17         History of Present Illness:     Sudarshan Skelton is a 28 year old male, history of right tympanostomy tube placed 10/23/17, here for routine follow up. He last saw Sandra Farr 11/22/17 for routine check. Things were going well at that time. Right Duravent tube was noted to be in place.    Today Sudarshan has no questions/concerns.  In June, had bronchitis and felt his right ear was plugged. He went in for that and they noted there was no further tube in place.  Since then intermittent feeling of right plugged sensation.  Popping in that right ear when yawns/swallows.  Intermittent muffled hearing, bothersome as he is a .  No otorrhea or otalgia.  No tinnitus or vertigo.  Denies facial paresthesias/dysphagia.   Can give himself temporary relief with popping his ears.   No current issues with allergies or cold symptoms.     Left ear is asymptomatic.          Review of Systems:     See HPI         Physical Exam:     /74 (Cuff Size: Adult Large)  Pulse 83  Temp 97.3  F (36.3  C) (Tympanic)  Ht 6' (1.829 m)  Wt 260 lb (117.9 kg)  SpO2 98%  BMI 35.26 kg/m2  General - The patient is well nourished and well developed, and appears to have good nutritional status.  Alert and oriented to person and place, interactive.  Head and Face - Normocephalic and atraumatic, with no gross asymmetry noted of the contour of the facial features.  The facial nerve is intact, with strong symmetric movements.  Neck-no palpable lymphadenopathy or thyroid mass.  Trachea is midline.  Eyes - Extraocular movements intact.   Ears- External ears normal. Ears examined under  microscope. Left EAC clear, TM intact without effusion/retraction. Right EAC with cerumen, debrided with cupped forceps, TM with attic retraction, no signs of cholesteatoma, effusion present. No tube is present.There is good mobility with valsalva, gives temporary relief of plugged sensation.   Nose - Nasal mucosa is pink and moist with no abnormal mucus.  The septum was grossly midline and non-obstructive, turbinates of normal size and position.  No polyps, masses, or purulence noted on examination.  Mouth - Examination of the oral cavity shows pink, healthy, moist mucosa. Dentition in good condition.  No lesions or ulceration noted. The tongue is mobile and midline.    Throat - The walls of the oropharynx were smooth, pink, moist, symmetric, and had no lesions or ulcerations.  The tonsillar pillars and soft palate were symmetric.  The uvula was midline on elevation.           Assessment and Plan:       ICD-10-CM    1. Retraction of tympanic membrane, right H73.891 fluticasone (FLONASE) 50 MCG/ACT spray   2. ETD (Eustachian tube dysfunction), right H69.81 fluticasone (FLONASE) 50 MCG/ACT spray   3. Plugged feeling in ear, right H93.8X1 fluticasone (FLONASE) 50 MCG/ACT spray     Right Duravent tube is no longer in.  Right ear with attic retraction, effusion. Symptomatic with plugged ear/muffled hearing.    Likely needs another tube put in right ear.    Start Flonase daily as directed.  Valsalva 3-4 times daily to help mechanically open up Eustachian tubes.    Follow up audiogram and see Dr. Guerrier for consideration of in office right tympanostomy tube placement.     Brittani John NP  ENT  [facility], [place]  [phone]      Again, thank you for allowing me to participate in the care of your patient.        Sincerely,        Brittani John, CLEM CNP

## 2018-09-19 NOTE — PROGRESS NOTES
Otolaryngology Progress Note           Chief Complaint:     Patient presents with:  RECHECK PE TUBES: S/P Myringotomy 10/23/17         History of Present Illness:     Sudarshan Skelton is a 28 year old male, history of right tympanostomy tube placed 10/23/17, here for routine follow up. He last saw Sandra Farr 11/22/17 for routine check. Things were going well at that time. Right Duravent tube was noted to be in place.    Today Sudarshan has no questions/concerns.  In June, had bronchitis and felt his right ear was plugged. He went in for that and they noted there was no further tube in place.  Since then intermittent feeling of right plugged sensation.  Popping in that right ear when yawns/swallows.  Intermittent muffled hearing, bothersome as he is a .  No otorrhea or otalgia.  No tinnitus or vertigo.  Denies facial paresthesias/dysphagia.   Can give himself temporary relief with popping his ears.   No current issues with allergies or cold symptoms.     Left ear is asymptomatic.          Review of Systems:     See HPI         Physical Exam:     /74 (Cuff Size: Adult Large)  Pulse 83  Temp 97.3  F (36.3  C) (Tympanic)  Ht 6' (1.829 m)  Wt 260 lb (117.9 kg)  SpO2 98%  BMI 35.26 kg/m2  General - The patient is well nourished and well developed, and appears to have good nutritional status.  Alert and oriented to person and place, interactive.  Head and Face - Normocephalic and atraumatic, with no gross asymmetry noted of the contour of the facial features.  The facial nerve is intact, with strong symmetric movements.  Neck-no palpable lymphadenopathy or thyroid mass.  Trachea is midline.  Eyes - Extraocular movements intact.   Ears- External ears normal. Ears examined under microscope. Left EAC clear, TM intact without effusion/retraction. Right EAC with cerumen, debrided with cupped forceps, TM with attic retraction, no signs of cholesteatoma, effusion present. No tube is present.There is good mobility  with valsalva, gives temporary relief of plugged sensation.   Nose - Nasal mucosa is pink and moist with no abnormal mucus.  The septum was grossly midline and non-obstructive, turbinates of normal size and position.  No polyps, masses, or purulence noted on examination.  Mouth - Examination of the oral cavity shows pink, healthy, moist mucosa. Dentition in good condition.  No lesions or ulceration noted. The tongue is mobile and midline.    Throat - The walls of the oropharynx were smooth, pink, moist, symmetric, and had no lesions or ulcerations.  The tonsillar pillars and soft palate were symmetric.  The uvula was midline on elevation.           Assessment and Plan:       ICD-10-CM    1. Retraction of tympanic membrane, right H73.891 fluticasone (FLONASE) 50 MCG/ACT spray   2. ETD (Eustachian tube dysfunction), right H69.81 fluticasone (FLONASE) 50 MCG/ACT spray   3. Plugged feeling in ear, right H93.8X1 fluticasone (FLONASE) 50 MCG/ACT spray     Right Duravent tube is no longer in.  Right ear with attic retraction, effusion. Symptomatic with plugged ear/muffled hearing.    Likely needs another tube put in right ear.    Start Flonase daily as directed.  Valsalva 3-4 times daily to help mechanically open up Eustachian tubes.    Follow up audiogram and see Dr. Guerrier for consideration of in office right tympanostomy tube placement.     Brittani John NP  ENT  [facility], [place]  [phone]

## 2018-09-19 NOTE — PATIENT INSTRUCTIONS
Flonase 2 sprays to each nostril once daily.    'pop' ear 3-4 times daily.    Follow up audiogram and see Dr. Guerrier for consideration of right tube.      Thank you for allowing Brittani John CNP and our ENT team to participate in your care.  If your medications are too expensive, please give the nurse a call.  We can possibly change this medication.  If you have a scheduling or an appointment question please contact [facility] Unit Coordinator at their direct line [phone].   ALL nursing questions or concerns can be directed to your ENT nurse at: [phone]- Rshn

## 2018-10-15 DIAGNOSIS — H91.93 DECREASED HEARING OF BOTH EARS: Primary | ICD-10-CM

## 2018-11-01 ENCOUNTER — OFFICE VISIT (OUTPATIENT)
Dept: OTOLARYNGOLOGY | Facility: OTHER | Age: 28
End: 2018-11-01
Attending: OTOLARYNGOLOGY
Payer: COMMERCIAL

## 2018-11-01 ENCOUNTER — OFFICE VISIT (OUTPATIENT)
Dept: AUDIOLOGY | Facility: OTHER | Age: 28
End: 2018-11-01
Attending: AUDIOLOGIST
Payer: COMMERCIAL

## 2018-11-01 VITALS
WEIGHT: 270 LBS | SYSTOLIC BLOOD PRESSURE: 122 MMHG | HEART RATE: 94 BPM | DIASTOLIC BLOOD PRESSURE: 80 MMHG | BODY MASS INDEX: 36.62 KG/M2 | TEMPERATURE: 97.9 F

## 2018-11-01 DIAGNOSIS — H65.91 MUCOID OTITIS MEDIA OF RIGHT EAR WITH EFFUSION: ICD-10-CM

## 2018-11-01 DIAGNOSIS — J30.1 SEASONAL ALLERGIC RHINITIS DUE TO POLLEN: ICD-10-CM

## 2018-11-01 DIAGNOSIS — H90.2 CONDUCTIVE HEARING LOSS, UNILATERAL: ICD-10-CM

## 2018-11-01 DIAGNOSIS — Z96.22 S/P MYRINGOTOMY WITH INSERTION OF TUBE: Primary | ICD-10-CM

## 2018-11-01 DIAGNOSIS — H90.A11 CONDUCTIVE HEARING LOSS OF RIGHT EAR WITH RESTRICTED HEARING OF LEFT EAR: ICD-10-CM

## 2018-11-01 DIAGNOSIS — H62.41 OTOMYCOSIS OF RIGHT EAR: ICD-10-CM

## 2018-11-01 DIAGNOSIS — H69.91 DYSFUNCTION OF RIGHT EUSTACHIAN TUBE: ICD-10-CM

## 2018-11-01 DIAGNOSIS — B36.9 OTOMYCOSIS OF RIGHT EAR: ICD-10-CM

## 2018-11-01 DIAGNOSIS — H69.91 ETD (EUSTACHIAN TUBE DYSFUNCTION), RIGHT: ICD-10-CM

## 2018-11-01 PROCEDURE — 92511 NASOPHARYNGOSCOPY: CPT | Performed by: OTOLARYNGOLOGY

## 2018-11-01 PROCEDURE — 69433 CREATE EARDRUM OPENING: CPT | Performed by: OTOLARYNGOLOGY

## 2018-11-01 PROCEDURE — 92567 TYMPANOMETRY: CPT | Performed by: AUDIOLOGIST

## 2018-11-01 PROCEDURE — 99214 OFFICE O/P EST MOD 30 MIN: CPT | Mod: 25 | Performed by: OTOLARYNGOLOGY

## 2018-11-01 PROCEDURE — 92557 COMPREHENSIVE HEARING TEST: CPT | Performed by: AUDIOLOGIST

## 2018-11-01 RX ORDER — OFLOXACIN 3 MG/ML
10 SOLUTION AURICULAR (OTIC) 2 TIMES DAILY
Qty: 10 ML | Refills: 1 | Status: SHIPPED | OUTPATIENT
Start: 2018-11-01 | End: 2018-11-08

## 2018-11-01 RX ORDER — LORATADINE 10 MG/1
10 TABLET ORAL DAILY
COMMUNITY
End: 2021-03-01

## 2018-11-01 ASSESSMENT — PAIN SCALES - GENERAL: PAINLEVEL: NO PAIN (0)

## 2018-11-01 NOTE — PROGRESS NOTES
Audiology Evaluation Completed. Please refer SCANNED AUDIOGRAM and/or TYMPANOGRAM for BACKGROUND, RESULTS, RECOMMENDATIONS.    UNDER RECOMMENDATIONS ON AUDIOGRAM PATIENT REFERRED TO ENT WITH SYMPTOMS      [place]ena OCLVIN, [facility]  Audiologist #[id number]        NO EPIC REFERRAL/ORDER NEEDED TO ENT BY AUDIOLOGY AS PATIENT ALREADY HAS AN APPOINTMENT WITH ENT

## 2018-11-01 NOTE — MR AVS SNAPSHOT
After Visit Summary   11/1/2018    Sudarshan Skelton    MRN: [id number]           Patient Information     Date Of Birth          1990        Visit Information        Provider Department      11/1/2018 8:45 AM La Nena Lucero AuD [facility]        Today's Diagnoses     Dysfunction of right eustachian tube        Conductive hearing loss, unilateral           Follow-ups after your visit        Your next 10 appointments already scheduled     Nov 01, 2018 10:45 AM CDT   (Arrive by 10:30 AM)   Return Visit with Paulette Guerrier MD   [facility]    [address]  [place] [address]   [phone]              Who to contact     If you have questions or need follow up information about today's clinic visit or your schedule please contact [facility] directly at [phone].  Normal or non-critical lab and imaging results will be communicated to you by MyChart, letter or phone within 4 business days after the clinic has received the results. If you do not hear from us within 7 days, please contact the clinic through MyChart or phone. If you have a critical or abnormal lab result, we will notify you by phone as soon as possible.  Submit refill requests through Open[employer] or call your pharmacy and they will forward the refill request to us. Please allow 3 business days for your refill to be completed.          Additional Information About Your Visit        Care EveryWhere ID     This is your Care EveryWhere ID. This could be used by other organizations to access your [place] medical records  [id number]         Blood Pressure from Last 3 Encounters:   09/19/18 122/74   11/22/17 120/70   10/23/17 120/72    Weight from Last 3 Encounters:   09/19/18 117.9 kg (260 lb)   11/22/17 117.9 kg (260 lb)   10/23/17 120.2 kg (265 lb)              We Performed the Following     AUDIOGRAM/TYMPANOGRAM - INTERFACE         Primary Care Provider Fax #    Physician No Ref-Primary [phone]       No address on file        Equal Access to Services     [facility] : Hadii aad ku hadjoaquín Chavez, wamichaelda natalialistair, estefani barnardda zahira, jj donnellin hayaaharshad dietrichaundrea yusuf rupinder powell. So [facility] [phone].    ATENCIÓN: Si habla español, tiene a davalos disposición servicios gratuitos de asistencia lingüística. Llame al [phone].    We comply with applicable federal civil rights laws and [place] laws. We do not discriminate on the basis of race, color, national origin, age, disability, sex, sexual orientation, or gender identity.            Thank you!     Thank you for choosing [facility]  for your care. Our goal is always to provide you with excellent care. Hearing back from our patients is one way we can continue to improve our services. Please take a few minutes to complete the written survey that you may receive in the mail after your visit with us. Thank you!             Your Updated Medication List - Protect others around you: Learn how to safely use, store and throw away your medicines at www.disposemymeds.org.          This list is accurate as of 11/1/18  9:15 AM.  Always use your most recent med list.                   Brand Name Dispense Instructions for use Diagnosis    fluticasone 50 MCG/ACT spray    FLONASE    1 Bottle    Spray 2 sprays into both nostrils daily    Retraction of tympanic membrane, right, ETD (Eustachian tube dysfunction), right, Plugged feeling in ear, right       loratadine 10 MG tablet    CLARITIN     Take 10 mg by mouth daily

## 2018-11-01 NOTE — PROGRESS NOTES
Otolaryngology Progress Note      History of Present Illness   Patient presents with:  RECHECK: right TM retraction, right EDT      Sudarshan Skelton is a 28 year old male  Presents for follow-up of his ears  He has a history of tube insertion in the right his tube is extruded and he has had otitis media since extrusion he saw Brittani recently who noted a right effusion     his symptoms started in May with congestion which he relays to grass allergy exposure  He denies chronic congestion but has congestion and postnasal drainage in the spring for which he takes Claritin  His symptoms exacerbated this year with worsening congestion and hearing loss in the right ear so [facility] and was diagnosed with otitis media with effusion they placed him on Zithromax    For the past 2-3 days he started to have some malodorous drainage from the right ear and started Ciprodex drops other than that he is not used otic drops chronically he denies water exposure to the ear other than routine showers.  He does not have diabetes          Audiogram 11/1/2018 reveals a stable right conductive hearing loss which is mild to moderate sloping to moderate to severe high-frequency loss in the right normal thresholds in the left with a sensorineural notch at 6K   Flat type B tympanogram in the right normal type A in the left SRT 30 dB right 10 dB left  His hearing is stable from 2017 11/22/2017 reveals normal thresholds with a mild sensorineural notch at 8K large-volume B tympanogram on the right  Physical Exam  /80 (BP Location: Right arm, Patient Position: Sitting, Cuff Size: Adult Large)  Pulse 94  Temp 97.9  F (36.6  C) (Tympanic)  Wt 122.5 kg (270 lb)  BMI 36.62 kg/m2  General - The patient is well nourished and well developed, and appears to have good nutritional status.  Alert and oriented to person and place, interactive.  Head and Face - Normocephalic and atraumatic, with no gross asymmetry noted of the contour  of the facial features.  The facial nerve is intact, with strong symmetric movements.  Neck-no palpable lymphadenopathy or thyroid mass.  Trachea is midline.  Eyes - Extraocular movements intact.   Ears- examined under microscopy bilaterally  Left External auditory canal is patent, tympanic membranes is intact without effusion or worrisome retractions   Right  distal canal and tympanic membrane coated with white sporangia.  Mild  canal edema  I anesthetized the right tympanic membrane with 1% lidocaine with 1-100,000 of epinephrine and then remove cerumen and sporangia from the right ear the tympanic membrane is intact dull and with the full serous effusion  No worrisome retractions  Nose - Nasal mucosa is pink and moist with no abnormal mucus.  The septum was grossly midline and non-obstructive, turbinates of normal size and position.  No polyps, masses, or purulence noted on examination.  Mouth - Examination of the oral cavity shows pink, healthy, moist mucosa.  No lesions or ulceration noted.  The dentition are in good repair.  The tongue is mobile and midline.  Throat - The walls of the oropharynx were smooth, pink, moist, symmetric, and had no lesions or ulcerations.  The tonsillar pillars and soft palate were symmetric.  The uvula was midline on elevation.      After informed consent was obtained and the nose was anesthetized with topical neosynepherine/lidocaine, the scope was advanced into the nares.  The inferior and middle meatus are clear bilaterally there is a septal deviation to the left side with 70% obstruction inferior turbinate hypertrophy bilaterally.   eustachian tubes are narrowed bilaterally secondary to adenoid hypertrophy he has grade 2 adenoids and eustachian tube mucosa is edematous    RIGHT Myringotomy with Tube Placement    Procedure - I discussed the risks and complications of  RIGHT tympanostomy tube insertion  Including topical anesthesia, bleeding, infection, change in  hearing or hearing loss, tympanic membrane perforation, need for additional surgery, chronic ear drainage, tube occlusion or need for tube reinsertion, cholesteatoma.   All questions were answered and the patient/and or guardian wishes are to proceed with surgical intervention.   After discussion of the risks and benefits of myringotomy.    I proceeded to position the patient in a supine position in the examination chair.  Using the binocular surgical microscope, I then proceeded to clean the  RIGHT canal of cerumen and squamous debris.  I was able to see the tympanic membrane.  Using a small cotton tipped applicator, I applied a tiny coating of phenol onto the tympanic membrane.  After visualizing a good debra, I then proceeded to use a myringotomy knife to make a radially oriented incision in the tympanic membrane.  Thick glue ear noted.  Effusion was removed with serial gentle irrigation and suctioning.  The effusion plugged several 5 sections I did use a 7 suction and saline to evacuate the middle ear I tried not to use a second suction anymore than necessary but was concerned suction and the mucoid effusion would plug the tube if not evacuated.  I eventually was able to clear the middle ear space .  Next, I proceeded to easily place a 1.27 mm duravent tube through the incision.  After confirming good positioning and a clearly visible open tube, otic drops were applied and a cotton ball was inserted into the canal.      Impression/Plan    ICD-10-CM    1. S/P myringotomy with insertion of tube Z96.22 ofloxacin (FLOXIN) 0.3 % otic solution   2. Mucoid otitis media of right ear with effusion H65.91    3. Conductive hearing loss of right ear with restricted hearing of left ear H90.A11    4. ETD (Eustachian tube dysfunction), right H69.81    5. Seasonal allergic rhinitis due to pollen J30.1    6. Otomycosis of right ear B36.9     H62.41          Ciprodex drops for 7 days.  Follow up audiogram in 1 month  At 1 month  follow-up we will ensure he does not have persistent otomycosis.    If he requires another tube I would then recommend insertion of a T-tube along with adenoidectomy and right endoscopic dilation of the eustachian tube this was discussed today.  He would need the ETD Q survey filled out and prior authorized      Instructions for Myringotomy Tubes ( Ear Tubes)    Recovery - The placement of ear tubes is a brief operation, and therefore the recovery from the anesthetic is usually less than a day.  However, in young children the sleep patterns, feeding, and behavior can be altered for several days.  Try to return to the daily routine as soon as possible and this issue will resolve without problems.  There are no restrictions to diet or activity after ear tube placement.    Medications - Children and adults can return to all preoperative medications after this procedure, including blood thinners.  You were sent home with ear drops, please use them as directed to assist in the rapid healing of the ear drum around the tube.  Pain medication may have been sent home with you, but a vast majority of the time, over the counter Tylenol or ibuprofen (advil) I sufficient. Finish ear drops given to you from surgery then start prescription ear drops (4 drops twice a day).     Complications - A low grade fever (up to 100 degrees ) is not unusual in the day after tubes are placed.  Treat this with cool wash cloths to the forehead and Tylenol.  If the fever is higher, or does not respond to medication, call the Doctor s office or call service after hours.  A small amount of bloody drainage can occur for a day or two after ear tubes, and is perfectly normal, continue the ear drops as directed and it will clear up.    Water Precautions - Recent clinical research has shown that absolute water precautions are not always necessary.  Ear plugs or water head bands are not necessary unless the ear is actively draining, or if your child does  not like the sensation of water in the ear.    Follow up - Approximately 1 month after the tubes are placed I like to examine the ears to make sure there are no signs of complications, which are extremely rare.  You should already have an appointment in 1 month with ENT PA and audiology.  If not, call our office at [phone].  In some unusual cases the ears  reject  the tubes.  Depending on the situation, a hearing test may or may not be performed at that time.  Afterwards, follow up is done every 6 months, but of course earlier if there are any issues or problems.    Advantages of Tubes - After ear tube placement, there are certain benefits from having a direct communication of the middle ear space with the ear canal.  In the event of drainage from the ears with ear tubes in place ( which is common with colds and flus ) use the ear drops you were discharged home with using the same dosage and instructions.  This will clear up the ears without the need for oral antibiotics a majority of the time.  Another advantage is that with tubes in place, the ears automatically adjust to changes in atmospheric pressure ( such as in airplanes or elevation ).  In other words, if the tubes are open the ears will not hurt or pop!    If there are any questions or issues with the above, or if there are other issues that concern you, always feel free to call the clinic and I am happy to speak with you as soon as I can.  Paulette Guerrier D.O.    Otolaryngology/Head and Neck Surgery/ Allergy      [phone]            Paulette Guerrier D.O.  Otolaryngology/Head and Neck Surgery  Allergy

## 2018-11-01 NOTE — MR AVS SNAPSHOT
After Visit Summary   11/1/2018    Sudarshan Skelton    MRN: [id number]           Patient Information     Date Of Birth          1990        Visit Information        Provider Department      11/1/2018 10:45 AM Paulette Guerrier MD [facility] [place]        Care Instructions    Thank you for allowing Dr. Guerrier and our ENT team to participate in your care.  If your medications are too expensive, please give the nurse a call.  We can possibly change this medication.  If you have a scheduling or an appointment question please contact our Health Unit Coordinator at their direct line [phone].   ALL nursing questions or concerns can be directed to your ENT nurse at: [phone] - [place]    Instructions for Myringotomy Tubes ( Ear Tubes)    Recovery - The placement of ear tubes is a brief operation, and therefore the recovery from the anesthetic is usually less than a day.  However, in young children the sleep patterns, feeding, and behavior can be altered for several days.  Try to return to the daily routine as soon as possible and this issue will resolve without problems.  There are no restrictions to diet or activity after ear tube placement.    Medications - Children and adults can return to all preoperative medications after this procedure, including blood thinners.  You were sent home with ear drops, please use them as directed to assist in the rapid healing of the ear drum around the tube.  Pain medication may have been sent home with you, but a vast majority of the time, over the counter Tylenol or ibuprofen (advil) I sufficient. Finish prescription ear drops (4 drops twice a day).     Complications - A low grade fever (up to 100 degrees ) is not unusual in the day after tubes are placed.  Treat this with cool wash cloths to the forehead and Tylenol.  If the fever is higher, or does not respond to medication, call the Doctor s office or call service after hours.  A small  amount of bloody drainage can occur for a day or two after ear tubes, and is perfectly normal, continue the ear drops as directed and it will clear up.    Water Precautions - Recent clinical research has shown that absolute water precautions are not always necessary.  Ear plugs or water head bands are not necessary unless the ear is actively draining, or if your child does not like the sensation of water in the ear.    Follow up - Approximately 1 month after the tubes are placed I like to examine the ears to make sure there are no signs of complications, which are extremely rare.  You should already have an appointment in 1 month with ENT PA and audiology.  If not, call our office at [phone].  In some unusual cases the ears  reject  the tubes.  Depending on the situation, a hearing test may or may not be performed at that time.  Afterwards, follow up is done every 6 months, but of course earlier if there are any issues or problems.    Advantages of Tubes - After ear tube placement, there are certain benefits from having a direct communication of the middle ear space with the ear canal.  In the event of drainage from the ears with ear tubes in place ( which is common with colds and flus ) use the ear drops you were discharged home with using the same dosage and instructions.  This will clear up the ears without the need for oral antibiotics a majority of the time.  Another advantage is that with tubes in place, the ears automatically adjust to changes in atmospheric pressure ( such as in airplanes or elevation ).  In other words, if the tubes are open the ears will not hurt or pop!            Follow-ups after your visit        Follow-up notes from your care team     Return in about 4 weeks (around 11/29/2018).      Your next 10 appointments already scheduled     Nov 01, 2018 10:45 AM CDT   (Arrive by 10:30 AM)   Return Visit with Paulette Guerrier MD   [facility] [address] ([facility]  [place] )    [address] [place] [address]   [phone]              Who to contact     If you have questions or need follow up information about today's clinic visit or your schedule please contact [facility] directly at [phone].  Normal or non-critical lab and imaging results will be communicated to you by MyChart, letter or phone within 4 business days after the clinic has received the results. If you do not hear from us within 7 days, please contact the clinic through MyChart or phone. If you have a critical or abnormal lab result, we will notify you by phone as soon as possible.  Submit refill requests through [employer] or call your pharmacy and they will forward the refill request to us. Please allow 3 business days for your refill to be completed.          Additional Information About Your Visit        Care EveryWhere ID     This is your Care EveryWhere ID. This could be used by other organizations to access your [facility] medical records  [id number]        Your Vitals Were     Pulse Temperature BMI (Body Mass Index)             94 97.9  F (36.6  C) (Tympanic) 36.62 kg/m2          Blood Pressure from Last 3 Encounters:   11/01/18 122/80   09/19/18 122/74   11/22/17 120/70    Weight from Last 3 Encounters:   11/01/18 122.5 kg (270 lb)   09/19/18 117.9 kg (260 lb)   11/22/17 117.9 kg (260 lb)              Today, you had the following     No orders found for display       Primary Care Provider Fax #    Physician No Ref-Primary [phone]       No address on file        Equal Access to Services     [facility]: Hadii aad ku hadasho Soomaali, waaxda luqadaha, qaybta kaalmada adeegyada, waxay hugo bucio . So [facility] [phone].    ATENCIÓN: Si habla español, tiene a davalos disposición servicios gratuitos de asistencia lingüística. Llame al [phone].    We comply with applicable federal civil rights laws and [place] laws. We do not discriminate on the  basis of race, color, national origin, age, disability, sex, sexual orientation, or gender identity.            Thank you!     Thank you for choosing [facility]  for your care. Our goal is always to provide you with excellent care. Hearing back from our patients is one way we can continue to improve our services. Please take a few minutes to complete the written survey that you may receive in the mail after your visit with us. Thank you!             Your Updated Medication List - Protect others around you: Learn how to safely use, store and throw away your medicines at www.disposemymeds.org.          This list is accurate as of 11/1/18 10:02 AM.  Always use your most recent med list.                   Brand Name Dispense Instructions for use Diagnosis    fluticasone 50 MCG/ACT spray    FLONASE    1 Bottle    Spray 2 sprays into both nostrils daily    Retraction of tympanic membrane, right, ETD (Eustachian tube dysfunction), right, Plugged feeling in ear, right       loratadine 10 MG tablet    CLARITIN     Take 10 mg by mouth daily

## 2018-11-01 NOTE — NURSING NOTE
Chief Complaint   Patient presents with     RECHECK     right TM retraction, right EDT       Initial /80 (BP Location: Right arm, Patient Position: Sitting, Cuff Size: Adult Large)  Pulse 94  Temp 97.9  F (36.6  C) (Tympanic)  Wt 122.5 kg (270 lb)  BMI 36.62 kg/m2 Estimated body mass index is 36.62 kg/(m^2) as calculated from the following:    Height as of 9/19/18: 1.829 m (6').    Weight as of this encounter: 122.5 kg (270 lb).  Medication Reconciliation: complete    JENNIFER GUTIÉRREZ LPN

## 2018-11-01 NOTE — LETTER
11/1/2018         RE: Sudarshan Skelton  [address]  [place] [address]        Dear Colleague,    Thank you for referring your patient, Sudarshan Skelton, to the [facility]. Please see a copy of my visit note below.    Otolaryngology Progress Note      History of Present Illness   Patient presents with:  RECHECK: right TM retraction, right EDT      Sudarshan Skelton is a 28 year old male  Presents for follow-up of his ears  He has a history of tube insertion in the right his tube is extruded and he has had otitis media since extrusion he saw Brittani recently who noted a right effusion     his symptoms started in May with congestion which he relays to grass allergy exposure  He denies chronic congestion but has congestion and postnasal drainage in the spring for which he takes Claritin  His symptoms exacerbated this year with worsening congestion and hearing loss in the right ear so [facility] and was diagnosed with otitis media with effusion they placed him on Zithromax    For the past 2-3 days he started to have some malodorous drainage from the right ear and started Ciprodex drops other than that he is not used otic drops chronically he denies water exposure to the ear other than routine showers.  He does not have diabetes          Audiogram 11/1/2018 reveals a stable right conductive hearing loss which is mild to moderate sloping to moderate to severe high-frequency loss in the right normal thresholds in the left with a sensorineural notch at 6K   Flat type B tympanogram in the right normal type A in the left SRT 30 dB right 10 dB left  His hearing is stable from 2017 11/22/2017 reveals normal thresholds with a mild sensorineural notch at 8K large-volume B tympanogram on the right  Physical Exam  /80 (BP Location: Right arm, Patient Position: Sitting, Cuff Size: Adult Large)  Pulse 94  Temp 97.9  F (36.6  C) (Tympanic)  Wt 122.5 kg (270 lb)  BMI 36.62 kg/m2  General - The  patient is well nourished and well developed, and appears to have good nutritional status.  Alert and oriented to person and place, interactive.  Head and Face - Normocephalic and atraumatic, with no gross asymmetry noted of the contour of the facial features.  The facial nerve is intact, with strong symmetric movements.  Neck-no palpable lymphadenopathy or thyroid mass.  Trachea is midline.  Eyes - Extraocular movements intact.   Ears- examined under microscopy bilaterally  Left External auditory canal is patent, tympanic membranes is intact without effusion or worrisome retractions   Right  distal canal and tympanic membrane coated with white sporangia.  Mild  canal edema  I anesthetized the right tympanic membrane with 1% lidocaine with 1-100,000 of epinephrine and then remove cerumen and sporangia from the right ear the tympanic membrane is intact dull and with the full serous effusion  No worrisome retractions  Nose - Nasal mucosa is pink and moist with no abnormal mucus.  The septum was grossly midline and non-obstructive, turbinates of normal size and position.  No polyps, masses, or purulence noted on examination.  Mouth - Examination of the oral cavity shows pink, healthy, moist mucosa.  No lesions or ulceration noted.  The dentition are in good repair.  The tongue is mobile and midline.  Throat - The walls of the oropharynx were smooth, pink, moist, symmetric, and had no lesions or ulcerations.  The tonsillar pillars and soft palate were symmetric.  The uvula was midline on elevation.      After informed consent was obtained and the nose was anesthetized with topical neosynepherine/lidocaine, the scope was advanced into the nares.  The inferior and middle meatus are clear bilaterally there is a septal deviation to the left side with 70% obstruction inferior turbinate hypertrophy bilaterally.   eustachian tubes are narrowed bilaterally secondary to adenoid hypertrophy he has grade 2 adenoids  and eustachian tube mucosa is edematous    RIGHT Myringotomy with Tube Placement    Procedure - I discussed the risks and complications of  RIGHT tympanostomy tube insertion  Including topical anesthesia, bleeding, infection, change in hearing or hearing loss, tympanic membrane perforation, need for additional surgery, chronic ear drainage, tube occlusion or need for tube reinsertion, cholesteatoma.   All questions were answered and the patient/and or guardian wishes are to proceed with surgical intervention.   After discussion of the risks and benefits of myringotomy.    I proceeded to position the patient in a supine position in the examination chair.  Using the binocular surgical microscope, I then proceeded to clean the  RIGHT canal of cerumen and squamous debris.  I was able to see the tympanic membrane.  Using a small cotton tipped applicator, I applied a tiny coating of phenol onto the tympanic membrane.  After visualizing a good debra, I then proceeded to use a myringotomy knife to make a radially oriented incision in the tympanic membrane.  Thick glue ear noted.  Effusion was removed with serial gentle irrigation and suctioning.  The effusion plugged several 5 sections I did use a 7 suction and saline to evacuate the middle ear I tried not to use a second suction anymore than necessary but was concerned suction and the mucoid effusion would plug the tube if not evacuated.  I eventually was able to clear the middle ear space .  Next, I proceeded to easily place a 1.27 mm duravent tube through the incision.  After confirming good positioning and a clearly visible open tube, otic drops were applied and a cotton ball was inserted into the canal.      Impression/Plan    ICD-10-CM    1. S/P myringotomy with insertion of tube Z96.22 ofloxacin (FLOXIN) 0.3 % otic solution   2. Mucoid otitis media of right ear with effusion H65.91    3. Conductive hearing loss of right ear with restricted hearing of left ear  H90.A11    4. ETD (Eustachian tube dysfunction), right H69.81    5. Seasonal allergic rhinitis due to pollen J30.1    6. Otomycosis of right ear B36.9     H62.41          Ciprodex drops for 7 days.  Follow up audiogram in 1 month  At 1 month follow-up we will ensure he does not have persistent otomycosis.    If he requires another tube I would then recommend insertion of a T-tube along with adenoidectomy and right endoscopic dilation of the eustachian tube this was discussed today.  He would need the ETD Q survey filled out and prior authorized      Instructions for Myringotomy Tubes ( Ear Tubes)    Recovery - The placement of ear tubes is a brief operation, and therefore the recovery from the anesthetic is usually less than a day.  However, in young children the sleep patterns, feeding, and behavior can be altered for several days.  Try to return to the daily routine as soon as possible and this issue will resolve without problems.  There are no restrictions to diet or activity after ear tube placement.    Medications - Children and adults can return to all preoperative medications after this procedure, including blood thinners.  You were sent home with ear drops, please use them as directed to assist in the rapid healing of the ear drum around the tube.  Pain medication may have been sent home with you, but a vast majority of the time, over the counter Tylenol or ibuprofen (advil) I sufficient. Finish ear drops given to you from surgery then start prescription ear drops (4 drops twice a day).     Complications - A low grade fever (up to 100 degrees ) is not unusual in the day after tubes are placed.  Treat this with cool wash cloths to the forehead and Tylenol.  If the fever is higher, or does not respond to medication, call the Doctor s office or call service after hours.  A small amount of bloody drainage can occur for a day or two after ear tubes, and is perfectly normal, continue the ear drops as directed and  it will clear up.    Water Precautions - Recent clinical research has shown that absolute water precautions are not always necessary.  Ear plugs or water head bands are not necessary unless the ear is actively draining, or if your child does not like the sensation of water in the ear.    Follow up - Approximately 1 month after the tubes are placed I like to examine the ears to make sure there are no signs of complications, which are extremely rare.  You should already have an appointment in 1 month with ENT PA and audiology.  If not, call our office at [phone].  In some unusual cases the ears  reject  the tubes.  Depending on the situation, a hearing test may or may not be performed at that time.  Afterwards, follow up is done every 6 months, but of course earlier if there are any issues or problems.    Advantages of Tubes - After ear tube placement, there are certain benefits from having a direct communication of the middle ear space with the ear canal.  In the event of drainage from the ears with ear tubes in place ( which is common with colds and flus ) use the ear drops you were discharged home with using the same dosage and instructions.  This will clear up the ears without the need for oral antibiotics a majority of the time.  Another advantage is that with tubes in place, the ears automatically adjust to changes in atmospheric pressure ( such as in airplanes or elevation ).  In other words, if the tubes are open the ears will not hurt or pop!    If there are any questions or issues with the above, or if there are other issues that concern you, always feel free to call the clinic and I am happy to speak with you as soon as I can.  Paulette Guerrier D.O.    Otolaryngology/Head and Neck Surgery/ Allergy      [phone]            Paulette Guerrier D.O.  Otolaryngology/Head and Neck Surgery  Allergy            Again, thank you for allowing me to participate in the care of your patient.         Sincerely,        Paulette Guerrier MD

## 2018-11-01 NOTE — PATIENT INSTRUCTIONS
Thank you for allowing Dr. Guerrier and our ENT team to participate in your care.  If your medications are too expensive, please give the nurse a call.  We can possibly change this medication.  If you have a scheduling or an appointment question please contact our Health Unit Coordinator at their direct line [phone].   ALL nursing questions or concerns can be directed to your ENT nurse at: [phone] - Edda    Instructions for Myringotomy Tubes ( Ear Tubes)    Recovery - The placement of ear tubes is a brief operation, and therefore the recovery from the anesthetic is usually less than a day.  However, in young children the sleep patterns, feeding, and behavior can be altered for several days.  Try to return to the daily routine as soon as possible and this issue will resolve without problems.  There are no restrictions to diet or activity after ear tube placement.    Medications - Children and adults can return to all preoperative medications after this procedure, including blood thinners.  You were sent home with ear drops, please use them as directed to assist in the rapid healing of the ear drum around the tube.  Pain medication may have been sent home with you, but a vast majority of the time, over the counter Tylenol or ibuprofen (advil) I sufficient. Finish prescription ear drops (4 drops twice a day).     Complications - A low grade fever (up to 100 degrees ) is not unusual in the day after tubes are placed.  Treat this with cool wash cloths to the forehead and Tylenol.  If the fever is higher, or does not respond to medication, call the Doctor s office or call service after hours.  A small amount of bloody drainage can occur for a day or two after ear tubes, and is perfectly normal, continue the ear drops as directed and it will clear up.    Water Precautions - Recent clinical research has shown that absolute water precautions are not always necessary.  Ear plugs or water head bands are not  necessary unless the ear is actively draining, or if your child does not like the sensation of water in the ear.    Follow up - Approximately 1 month after the tubes are placed I like to examine the ears to make sure there are no signs of complications, which are extremely rare.  You should already have an appointment in 1 month with ENT PA and audiology.  If not, call our office at [phone].  In some unusual cases the ears  reject  the tubes.  Depending on the situation, a hearing test may or may not be performed at that time.  Afterwards, follow up is done every 6 months, but of course earlier if there are any issues or problems.    Advantages of Tubes - After ear tube placement, there are certain benefits from having a direct communication of the middle ear space with the ear canal.  In the event of drainage from the ears with ear tubes in place ( which is common with colds and flus ) use the ear drops you were discharged home with using the same dosage and instructions.  This will clear up the ears without the need for oral antibiotics a majority of the time.  Another advantage is that with tubes in place, the ears automatically adjust to changes in atmospheric pressure ( such as in airplanes or elevation ).  In other words, if the tubes are open the ears will not hurt or pop!

## 2018-11-09 DIAGNOSIS — H91.93 DECREASED HEARING OF BOTH EARS: Primary | ICD-10-CM

## 2018-12-10 ENCOUNTER — OFFICE VISIT (OUTPATIENT)
Dept: AUDIOLOGY | Facility: OTHER | Age: 28
End: 2018-12-10
Attending: AUDIOLOGIST
Payer: COMMERCIAL

## 2018-12-10 DIAGNOSIS — H91.93 DECREASED HEARING OF BOTH EARS: ICD-10-CM

## 2018-12-10 PROCEDURE — 92552 PURE TONE AUDIOMETRY AIR: CPT | Mod: 52 | Performed by: AUDIOLOGIST

## 2018-12-10 PROCEDURE — 92556 SPEECH AUDIOMETRY COMPLETE: CPT | Performed by: AUDIOLOGIST

## 2018-12-10 PROCEDURE — 92567 TYMPANOMETRY: CPT | Performed by: AUDIOLOGIST

## 2018-12-10 NOTE — PROGRESS NOTES
Audiology Evaluation Completed. Please refer SCANNED AUDIOGRAM and/or TYMPANOGRAM for BACKGROUND, RESULTS, RECOMMENDATIONS.      [place] Nena COLVIN, [facility]  Audiologist #[id number]

## 2018-12-11 ENCOUNTER — OFFICE VISIT (OUTPATIENT)
Dept: OTOLARYNGOLOGY | Facility: OTHER | Age: 28
End: 2018-12-11
Attending: AUDIOLOGIST
Payer: COMMERCIAL

## 2018-12-11 VITALS
WEIGHT: 270 LBS | TEMPERATURE: 97.9 F | BODY MASS INDEX: 36.57 KG/M2 | HEIGHT: 72 IN | DIASTOLIC BLOOD PRESSURE: 74 MMHG | HEART RATE: 81 BPM | SYSTOLIC BLOOD PRESSURE: 118 MMHG

## 2018-12-11 DIAGNOSIS — B97.89 VIRAL SINUSITIS: ICD-10-CM

## 2018-12-11 DIAGNOSIS — J30.1 SEASONAL ALLERGIC RHINITIS DUE TO POLLEN: ICD-10-CM

## 2018-12-11 DIAGNOSIS — H90.3 SENSORINEURAL HEARING LOSS (SNHL) OF BOTH EARS: ICD-10-CM

## 2018-12-11 DIAGNOSIS — J32.9 VIRAL SINUSITIS: ICD-10-CM

## 2018-12-11 DIAGNOSIS — Z96.22 S/P MYRINGOTOMY WITH INSERTION OF TUBE: Primary | ICD-10-CM

## 2018-12-11 PROCEDURE — 92504 EAR MICROSCOPY EXAMINATION: CPT | Performed by: NURSE PRACTITIONER

## 2018-12-11 PROCEDURE — 99213 OFFICE O/P EST LOW 20 MIN: CPT | Mod: 25 | Performed by: NURSE PRACTITIONER

## 2018-12-11 ASSESSMENT — MIFFLIN-ST. JEOR: SCORE: 2227.71

## 2018-12-11 ASSESSMENT — PAIN SCALES - GENERAL: PAINLEVEL: NO PAIN (0)

## 2018-12-11 NOTE — PATIENT INSTRUCTIONS
Thank you for allowing Brittani John CNP and our ENT team to participate in your care.  If your medications are too expensive, please give the nurse a call.  We can possibly change this medication.  If you have a scheduling or an appointment question please contact [facility] Health Unit Coordinator at their direct line [phone]  ALL nursing questions or concerns can be directed to your ENT nurse at: [phone] - Maria Dolores    Follow up in 6 months for a tube check

## 2018-12-11 NOTE — NURSING NOTE
Chief Complaint   Patient presents with     Ear Tube Follow Up     Right tube check       Initial /74   Pulse 81   Temp 97.9  F (36.6  C) (Tympanic)   Ht 1.829 m (6')   Wt 122.5 kg (270 lb)   BMI 36.62 kg/m   Estimated body mass index is 36.62 kg/m  as calculated from the following:    Height as of this encounter: 1.829 m (6').    Weight as of this encounter: 122.5 kg (270 lb).  Medication Reconciliation: complete    Maria Dolores Nichols LPN

## 2018-12-11 NOTE — PROGRESS NOTES
Otolaryngology Progress Note           Chief Complaint:     Chief Complaint   Patient presents with     Ear Tube Follow Up     Right tube check          History of Present Illness:     Sudarshan Skelton is a 28 year old male s/p in office right myringotomy with tube insertion 11/1/18. At that time, Dr. Guerrier also debrided sporangia right distal EAC and TM. She also noted that if he needs future tube replacement, that she would recommended t-tube insertion along with adenoidectomy and possibly right endoscopic eustachian tube dilation.      Today he reports he feels his hearing is back to his baseline. He completed one week of otic drops as directed and actually completed the entire bottle. No otalgia or otorrhea. No ear pruritis. No pressure/aural fullness. No tinnitus/vertigo.    For < 1 week bilateral maxillary sinus pressure, PND. No sinus pain. No purulent rhinorrhea. No fevers. He is using Flonase. Overall, feels his symptoms are slowly improving.     Audiogram 11/1/2018   Stable right conductive hearing loss which is mild to moderate sloping to moderate to severe high-frequency loss in the right normal thresholds in the left with a sensorineural notch at 6K   Flat type B tympanogram in the right normal type A in the left SRT 30 dB right 10 dB left  His hearing is stable from 2017    Audiogram 12/11/18 (right ear only tested)  Tympanogram Type B large volume.  Thresholds show conductive loss resolved and shows normal to moderate SNHL notch at 6000 Hz, SRT 15 dB, %.         Review of Systems:     ROS: See HPI         Physical Exam:   /74   Pulse 81   Temp 97.9  F (36.6  C) (Tympanic)   Ht 1.829 m (6')   Wt 122.5 kg (270 lb)   BMI 36.62 kg/m      General - The patient is well nourished and well developed, and appears to have good nutritional status.  Alert and oriented to person and place, interactive.  Head and Face - Normocephalic and atraumatic, with no gross asymmetry noted of the contour of  the facial features.  The facial nerve is intact, with strong symmetric movements.  Neck- No palpable lymphadenopathy or thyroid mass.  Trachea is midline.  Eyes - Extraocular movements intact.   Ears- External ears normal. Ears examined under microscope. Left EAC patent, TM intact. Right EAC patent, TM with PE tube in place and patent. Scant residual otic drop debris, but no signs of sporangia. No otorrhea. TM without effusion/retraction.   Nose - Nasal mucosa is pink and moist with no abnormal mucus.  The septum was grossly midline and non-obstructive, turbinates of normal size and position.  No polyps, masses, or purulence noted on examination.  Mouth - Examination of the oral cavity shows pink, healthy, moist mucosa. Dentition in good condition.  No lesions or ulceration noted.  The dentition are in good repair.  The tongue is mobile and midline.    Throat - The walls of the oropharynx were smooth, pink, moist, symmetric, and had no lesions or ulcerations.  The tonsillar pillars and soft palate were symmetric.  The uvula was midline on elevation.           Assessment and Plan:       ICD-10-CM    1. S/P myringotomy with insertion of tube Z96.22     Right PE tube, 11/1/18   2. Seasonal allergic rhinitis due to pollen J30.1    3. Sensorineural hearing loss (SNHL) of both ears H90.3    4. Viral sinusitis J32.9     B97.89      Conductive hearing loss resolved.    He notes hearing returned to baseline with no further concerns.    Viral sinusitis: Continue Flonase and recommend high flow nasal saline irrigation, ibuprofen and Mucinex. Call if symptoms persist beyond one week, including purulent nasal drainage. Then consider treating as bacterial sinusitis.    Continue with PRN antihistamines for seasonal allergies along with PRN sinus irrigations.    Return in 6 months for routine tube check, sooner as needed.     Brittani John NP  ENT  [facility], [place]  [phone]

## 2018-12-11 NOTE — LETTER
12/11/2018         RE: Sudarshan Skelton  [address]  [place] [address]        Dear Colleague,    Thank you for referring your patient, Sudarshan Skelton, to the [facility]. Please see a copy of my visit note below.    Otolaryngology Progress Note           Chief Complaint:     Chief Complaint   Patient presents with     Ear Tube Follow Up     Right tube check          History of Present Illness:     Sudarshan Skelton is a 28 year old male s/p in office right myringotomy with tube insertion 11/1/18. At that time, Dr. Guerrier also debrided sporangia right distal EAC and TM. She also noted that if he needs future tube replacement, that she would recommended t-tube insertion along with adenoidectomy and possibly right endoscopic eustachian tube dilation.      Today he reports he feels his hearing is back to his baseline. He completed one week of otic drops as directed and actually completed the entire bottle. No otalgia or otorrhea. No ear pruritis. No pressure/aural fullness. No tinnitus/vertigo.    For < 1 week bilateral maxillary sinus pressure, PND. No sinus pain. No purulent rhinorrhea. No fevers. He is using Flonase. Overall, feels his symptoms are slowly improving.     Audiogram 11/1/2018   Stable right conductive hearing loss which is mild to moderate sloping to moderate to severe high-frequency loss in the right normal thresholds in the left with a sensorineural notch at 6K   Flat type B tympanogram in the right normal type A in the left SRT 30 dB right 10 dB left  His hearing is stable from 2017    Audiogram 12/11/18 (right ear only tested)  Tympanogram Type B large volume.  Thresholds show conductive loss resolved and shows normal to moderate SNHL notch at 6000 Hz, SRT 15 dB, %.         Review of Systems:     ROS: See HPI         Physical Exam:   /74   Pulse 81   Temp 97.9  F (36.6  C) (Tympanic)   Ht 1.829 m (6')   Wt 122.5 kg (270 lb)   BMI 36.62 kg/m       General - The  patient is well nourished and well developed, and appears to have good nutritional status.  Alert and oriented to person and place, interactive.  Head and Face - Normocephalic and atraumatic, with no gross asymmetry noted of the contour of the facial features.  The facial nerve is intact, with strong symmetric movements.  Neck- No palpable lymphadenopathy or thyroid mass.  Trachea is midline.  Eyes - Extraocular movements intact.   Ears- External ears normal. Ears examined under microscope. Left EAC patent, TM intact. Right EAC patent, TM with PE tube in place and patent. Scant residual otic drop debris, but no signs of sporangia. No otorrhea. TM without effusion/retraction.   Nose - Nasal mucosa is pink and moist with no abnormal mucus.  The septum was grossly midline and non-obstructive, turbinates of normal size and position.  No polyps, masses, or purulence noted on examination.  Mouth - Examination of the oral cavity shows pink, healthy, moist mucosa. Dentition in good condition.  No lesions or ulceration noted.  The dentition are in good repair.  The tongue is mobile and midline.    Throat - The walls of the oropharynx were smooth, pink, moist, symmetric, and had no lesions or ulcerations.  The tonsillar pillars and soft palate were symmetric.  The uvula was midline on elevation.           Assessment and Plan:       ICD-10-CM    1. S/P myringotomy with insertion of tube Z96.22     Right PE tube, 11/1/18   2. Seasonal allergic rhinitis due to pollen J30.1    3. Sensorineural hearing loss (SNHL) of both ears H90.3    4. Viral sinusitis J32.9     B97.89      Conductive hearing loss resolved.    He notes hearing returned to baseline with no further concerns.    Viral sinusitis: Continue Flonase and recommend high flow nasal saline irrigation, ibuprofen and Mucinex. Call if symptoms persist beyond one week, including purulent nasal drainage. Then consider treating as bacterial sinusitis.    Continue with PRN  antihistamines for seasonal allergies along with PRN sinus irrigations.    Return in 6 months for routine tube check, sooner as needed.     Brittani John NP  ENT  [facility], [place]  [phone]              Again, thank you for allowing me to participate in the care of your patient.        Sincerely,        CLEM Longoria CNP

## 2019-06-05 ENCOUNTER — OFFICE VISIT (OUTPATIENT)
Dept: OTOLARYNGOLOGY | Facility: OTHER | Age: 29
End: 2019-06-05
Attending: NURSE PRACTITIONER
Payer: COMMERCIAL

## 2019-06-05 VITALS
HEIGHT: 72 IN | WEIGHT: 270 LBS | DIASTOLIC BLOOD PRESSURE: 70 MMHG | HEART RATE: 85 BPM | TEMPERATURE: 97.9 F | BODY MASS INDEX: 36.57 KG/M2 | SYSTOLIC BLOOD PRESSURE: 100 MMHG | OXYGEN SATURATION: 95 %

## 2019-06-05 DIAGNOSIS — H74.41 AURAL POLYP OF MIDDLE EAR, RIGHT: ICD-10-CM

## 2019-06-05 DIAGNOSIS — H93.8X1 PLUGGED FEELING IN EAR, RIGHT: ICD-10-CM

## 2019-06-05 DIAGNOSIS — J30.1 SEASONAL ALLERGIC RHINITIS DUE TO POLLEN: ICD-10-CM

## 2019-06-05 DIAGNOSIS — H69.91 ETD (EUSTACHIAN TUBE DYSFUNCTION), RIGHT: ICD-10-CM

## 2019-06-05 DIAGNOSIS — Z96.22 S/P MYRINGOTOMY WITH INSERTION OF TUBE: Primary | ICD-10-CM

## 2019-06-05 PROCEDURE — 92504 EAR MICROSCOPY EXAMINATION: CPT | Performed by: NURSE PRACTITIONER

## 2019-06-05 PROCEDURE — 99213 OFFICE O/P EST LOW 20 MIN: CPT | Mod: 25 | Performed by: NURSE PRACTITIONER

## 2019-06-05 RX ORDER — CIPROFLOXACIN AND DEXAMETHASONE 3; 1 MG/ML; MG/ML
4 SUSPENSION/ DROPS AURICULAR (OTIC) 2 TIMES DAILY
Qty: 1 BOTTLE | Refills: 0 | Status: SHIPPED | OUTPATIENT
Start: 2019-06-05 | End: 2019-07-11

## 2019-06-05 ASSESSMENT — PAIN SCALES - GENERAL: PAINLEVEL: NO PAIN (0)

## 2019-06-05 ASSESSMENT — MIFFLIN-ST. JEOR: SCORE: 2227.71

## 2019-06-05 NOTE — PATIENT INSTRUCTIONS
Ciprodex drops to Right ear.  Keep ear dry.  Follow up in 2 weeks.    Thank you for allowing Brittani John CNP and our ENT team to participate in your care.  If your medications are too expensive, please give the nurse a call.  We can possibly change this medication.  If you have a scheduling or an appointment question please contact [facility] Unit Coordinator at their direct line [phone]  ALL nursing questions or concerns can be directed to your ENT nurse at: [phone] - Maria Dolores

## 2019-06-05 NOTE — PROGRESS NOTES
Otolaryngology Progress Note           Chief Complaint:     Chief Complaint   Patient presents with     Follow Up     Right Tube Check          History of Present Illness:     Sudarshan Skelton is a 28 year old male, s/p in office right myringotomy with tube insertion 11/1/18. He is here for routine tube check. I last saw him 12/11/18 where he reported his hearing normalized post tube insertion, PE tube was noted to be in place and patent and conductive hearing loss had resolved.     At past visit, Dr. Guerrier did mention that if he needs future tube replacement, that she would recommended t-tube insertion along with adenoidectomy and possibly right endoscopic eustachian tube dilation.      Today he tells me that for the last few months, he has had intermittent plugging of his right ear again, muffled hearing comes with it. Will try to pop his ear, but this will cause vertigo. There is no vertigo at any other times. At times he wonders if his ear is more plugged with grass exposure. Can be plugged for a week, resolved with PRN claritin and flonase, then will be OK for 1 week, etc.    Is noticing crackling.  Denies otalgia/otorrhea.    Today states his ear feels good, no current pressure.     History of mild seasonal allergies, states overall very controlled with no current symptoms, nor daily AH/nasal steroid use.     He is very bothered by these ongoing/intermittent symptoms, stating it is starting to affect his job as a  and hoping something can be done this summer, prior to next school year starting.     Audiogram 12/11/18 (right ear only tested)  Tympanogram Type B large volume.  Thresholds show conductive loss resolved and shows normal to moderate SNHL notch at 6000 Hz, SRT 15 dB, %.    Audiogram 11/1/2018   Stable right conductive hearing loss which is mild to moderate sloping to moderate to severe high-frequency loss in the right normal thresholds in the left with a sensorineural notch at 6K    Flat type B tympanogram in the right normal type A in the left SRT 30 dB right 10 dB left  His hearing is stable from 2017         Review of Systems:     ROS: See HPI         Physical Exam:   /70   Pulse 85   Temp 97.9  F (36.6  C) (Tympanic)   Ht 1.829 m (6')   Wt 122.5 kg (270 lb)   SpO2 95%   BMI 36.62 kg/m      General - The patient is well nourished and well developed, and appears to have good nutritional status.  Alert and oriented to person and place, interactive.  Head and Face - Normocephalic and atraumatic, with no gross asymmetry noted of the contour of the facial features.  The facial nerve is intact, with strong symmetric movements.  Neck- No palpable lymphadenopathy or thyroid mass.  Trachea is midline.  Eyes - Extraocular movements intact.   Ears- External ears normal. Ears examined under microscope. Left EAC patent, TM intact. Right EAC patent, TM with PE tube in place, occluded with soft debris, which is also around 50% of the TM, debrided with #3 suction, polyp in PE tube, causing complete occluding and cannot be successfully debrided, no bleeding.TM appears thicker superior quadrant, but appears to be intact without effusion in the inferior quadrants.  Ciprodex applied to right ear.   Nose - Nasal mucosa is pink and moist with no abnormal mucus.  The septum was grossly midline and non-obstructive, turbinates of normal size and position.  No polyps, masses, or purulence noted on examination.  Mouth - Examination of the oral cavity shows pink, healthy, moist mucosa. Dentition in good condition.  No lesions or ulceration noted.  The dentition are in good repair.  The tongue is mobile and midline.    Throat - The walls of the oropharynx were smooth, pink, moist, symmetric, and had no lesions or ulcerations.  The tonsillar pillars and soft palate were symmetric.  The uvula was midline on elevation.           Assessment and Plan:       ICD-10-CM    1. S/P myringotomy with insertion of tube  Z96.22    2. 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 Seasonal allergic rhinitis due to pollen J30.1    4. ETD (Eustachian tube dysfunction), right H69.81    5. Plugged feeling in ear, right H93.8X1      Chronic right ETD, with ongoing intermittent symptoms, despite last PE tube placement.  He is hoping he can get the previously recommended endoscopic dilation of the right eustachian tube prior to the start of the next school year, as it intermittently affects his hearing as a .     Polyp occluding PE tube.  Ciprodex to right ear as directed x 1 week, otherwise keep it dry.  Return to see me in 1-2 weeks.   If polyp is not resolved, can do antibiotic/steroid powder in office.     Recommend daily AH and nasal steroid which may improve ear plugging/muffled hearing with allergen exposure.     He is also going to follow-up with Dr. Guerrier as he wants to discuss previously recommended surgical options.    Encouraged him to follow-up sooner as needed.     Brittani John NP  ENT  [facility], [place]  [phone]

## 2019-06-05 NOTE — NURSING NOTE
Chief Complaint   Patient presents with     Follow Up     Right Tube Check       Initial /70   Pulse 85   Temp 97.9  F (36.6  C) (Tympanic)   Ht 1.829 m (6')   Wt 122.5 kg (270 lb)   SpO2 95%   BMI 36.62 kg/m   Estimated body mass index is 36.62 kg/m  as calculated from the following:    Height as of this encounter: 1.829 m (6').    Weight as of this encounter: 122.5 kg (270 lb).  Medication Reconciliation: complete    Ella Mcelroy LPN

## 2019-06-05 NOTE — LETTER
6/5/2019         RE: Sudarshan Skelton  [address]  [place] [address]        Dear Colleague,    Thank you for referring your patient, Sudarshan Skelton, to the [facility]. Please see a copy of my visit note below.    Otolaryngology Progress Note           Chief Complaint:     Chief Complaint   Patient presents with     Follow Up     Right Tube Check          History of Present Illness:     Sudarshan Skelton is a 28 year old male, s/p in office right myringotomy with tube insertion 11/1/18. He is here for routine tube check. I last saw him 12/11/18 where he reported his hearing normalized post tube insertion, PE tube was noted to be in place and patent and conductive hearing loss had resolved.     At past visit, Dr. Guerrier did mention that if he needs future tube replacement, that she would recommended t-tube insertion along with adenoidectomy and possibly right endoscopic eustachian tube dilation.      Today he tells me that for the last few months, he has had intermittent plugging of his right ear again, muffled hearing comes with it. Will try to pop his ear, but this will cause vertigo. There is no vertigo at any other times. At times he wonders if his ear is more plugged with grass exposure. Can be plugged for a week, resolved with PRN claritin and flonase, then will be OK for 1 week, etc.    Is noticing crackling.  Denies otalgia/otorrhea.    Today states his ear feels good, no current pressure.     History of mild seasonal allergies, states overall very controlled with no current symptoms, nor daily AH/nasal steroid use.     He is very bothered by these ongoing/intermittent symptoms, stating it is starting to affect his job as a  and hoping something can be done this summer, prior to next school year starting.     Audiogram 12/11/18 (right ear only tested)  Tympanogram Type B large volume.  Thresholds show conductive loss resolved and shows normal to moderate SNHL notch at 6000 Hz,  SRT 15 dB, %.    Audiogram 11/1/2018   Stable right conductive hearing loss which is mild to moderate sloping to moderate to severe high-frequency loss in the right normal thresholds in the left with a sensorineural notch at 6K   Flat type B tympanogram in the right normal type A in the left SRT 30 dB right 10 dB left  His hearing is stable from 2017         Review of Systems:     ROS: See HPI         Physical Exam:   /70   Pulse 85   Temp 97.9  F (36.6  C) (Tympanic)   Ht 1.829 m (6')   Wt 122.5 kg (270 lb)   SpO2 95%   BMI 36.62 kg/m       General - The patient is well nourished and well developed, and appears to have good nutritional status.  Alert and oriented to person and place, interactive.  Head and Face - Normocephalic and atraumatic, with no gross asymmetry noted of the contour of the facial features.  The facial nerve is intact, with strong symmetric movements.  Neck- No palpable lymphadenopathy or thyroid mass.  Trachea is midline.  Eyes - Extraocular movements intact.   Ears- External ears normal. Ears examined under microscope. Left EAC patent, TM intact. Right EAC patent, TM with PE tube in place, occluded with soft debris, which is also around 50% of the TM, debrided with #3 suction, polyp in PE tube, causing complete occluding and cannot be successfully debrided, no bleeding.TM appears thicker superior quadrant, but appears to be intact without effusion in the inferior quadrants.  Ciprodex applied to right ear.   Nose - Nasal mucosa is pink and moist with no abnormal mucus.  The septum was grossly midline and non-obstructive, turbinates of normal size and position.  No polyps, masses, or purulence noted on examination.  Mouth - Examination of the oral cavity shows pink, healthy, moist mucosa. Dentition in good condition.  No lesions or ulceration noted.  The dentition are in good repair.  The tongue is mobile and midline.    Throat - The walls of the oropharynx were smooth, pink,  moist, symmetric, and had no lesions or ulcerations.  The tonsillar pillars and soft palate were symmetric.  The uvula was midline on elevation.           Assessment and Plan:       ICD-10-CM    1. S/P myringotomy with insertion of tube Z96.22    2. 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 Seasonal allergic rhinitis due to pollen J30.1    4. ETD (Eustachian tube dysfunction), right H69.81    5. Plugged feeling in ear, right H93.8X1      Chronic right ETD, with ongoing intermittent symptoms, despite last PE tube placement.  He is hoping he can get the previously recommended endoscopic dilation of the right eustachian tube prior to the start of the next school year, as it intermittently affects his hearing as a .     Polyp occluding PE tube.  Ciprodex to right ear as directed x 1 week, otherwise keep it dry.  Return to see me in 1-2 weeks.   If polyp is not resolved, can do antibiotic/steroid powder in office.     Recommend daily AH and nasal steroid which may improve ear plugging/muffled hearing with allergen exposure.     He is also going to follow-up with Dr. Guerrier as he wants to discuss previously recommended surgical options.    Encouraged him to follow-up sooner as needed.     Brittani John NP  ENT  [facility], [place]  [phone]              Again, thank you for allowing me to participate in the care of your patient.        Sincerely,        Brittani John, CLEM CNP

## 2019-07-11 ENCOUNTER — OFFICE VISIT (OUTPATIENT)
Dept: OTOLARYNGOLOGY | Facility: OTHER | Age: 29
End: 2019-07-11
Attending: OTOLARYNGOLOGY
Payer: COMMERCIAL

## 2019-07-11 VITALS
TEMPERATURE: 97.7 F | DIASTOLIC BLOOD PRESSURE: 72 MMHG | HEIGHT: 72 IN | HEART RATE: 88 BPM | WEIGHT: 270 LBS | BODY MASS INDEX: 36.57 KG/M2 | SYSTOLIC BLOOD PRESSURE: 130 MMHG

## 2019-07-11 DIAGNOSIS — H90.3 SENSORINEURAL HEARING LOSS (SNHL) OF BOTH EARS: ICD-10-CM

## 2019-07-11 DIAGNOSIS — Z96.22 S/P MYRINGOTOMY WITH INSERTION OF TUBE: Primary | ICD-10-CM

## 2019-07-11 PROCEDURE — 92504 EAR MICROSCOPY EXAMINATION: CPT | Performed by: OTOLARYNGOLOGY

## 2019-07-11 PROCEDURE — 99213 OFFICE O/P EST LOW 20 MIN: CPT | Mod: 25 | Performed by: OTOLARYNGOLOGY

## 2019-07-11 ASSESSMENT — MIFFLIN-ST. JEOR: SCORE: 2222.71

## 2019-07-11 ASSESSMENT — PAIN SCALES - GENERAL: PAINLEVEL: NO PAIN (0)

## 2019-07-11 NOTE — NURSING NOTE
Chief Complaint   Patient presents with     Ear Problem     Pt is here for right aural polyp of middle ear, PRABHA, and right ETD.  Discuss ET dilation.       Initial /72 (BP Location: Left arm, Cuff Size: Adult Large)   Pulse 88   Temp 97.7  F (36.5  C) (Tympanic)   Ht 1.829 m (6')   Wt 122.5 kg (270 lb)   BMI 36.62 kg/m   Estimated body mass index is 36.62 kg/m  as calculated from the following:    Height as of this encounter: 1.829 m (6').    Weight as of this encounter: 122.5 kg (270 lb).  Medication Reconciliation: complete   Anne Banerjee LPN

## 2019-07-11 NOTE — PROGRESS NOTES
Otolaryngology Progress Note          Sudarshan Skelton is a 29 year old male  Presents for follow-up of his ears most recent audiogram dated 12/10/2018 reveals normal thresholds with a 6K sensorineural notch large type B tympanogram in the right history of right tube insertion in the office on 11/1/2018    Hx of HFSNHL  Hearing normalized post tube insertion    At his last visit I recommended adenoidectomy and eustachian tube dilation if he required another T-tube    Symptoms currently include right ear plugging and popping he is tried Valsalva which leads to vertigo  Brittani noted an aural polyp on his recent exam in June.  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  Alert and oriented to person and place, interactive.  Head and Face - Normocephalic and atraumatic, with no gross asymmetry noted of the contour of the facial features.  The facial nerve is intact, with strong symmetric movements.  Eyes - Extraocular movements intact.   Ears- examined under microscopy bilaterally  External auditory canals are patent, left tympanic membrane is intact without effusion or worrisome retractions   Right tube is occluded, small aural polyp at base of tube, no otorrhea, no retractions  Good mobility of TM with valsalva    No flex exam or rigid performed today  (Flex exam 11/1/18 After informed consent was obtained and the nose was anesthetized with topical neosynepherine/lidocaine, the scope was advanced into the  nares.  The inferior and middle meatus are clear bilaterally there is a septal deviation to the left side with 70% obstruction inferior turbinate hypertrophy bilaterally.   eustachian tubes are narrowed bilaterally secondary to adenoid hypertrophy he has grade 2 adenoids and eustachian tube mucosa is edematous)         Impression/Plan  Sudarshan Skelton is a 29 year old male    ICD-10-CM    1. h/o myringotomy with insertion of tube, current non-funcitoning right tube Z96.22    2. Sensorineural hearing loss (SNHL) of both ears H90.3        Repeat audio prior to 4 month f/u with me    Tube should start to extrude, will follow with surveillance since he has no ear complaints and TM looks good    Isolated 6k SNHL, as long as no asymmetry can follow with q2 year audio      Paulette Guerrier D.O.  Otolaryngology/Head and Neck Surgery  Allergy        3 no additional today and had one recently

## 2019-07-11 NOTE — LETTER
7/11/2019         RE: Sudarshan Skelton  [address]  [place] [address]        Dear Colleague,    Thank you for referring your patient, Sudarshan Skelton, to the [facility]. Please see a copy of my visit note below.    Otolaryngology Progress Note          Sudarshan Skelton is a 29 year old male  Presents for follow-up of his ears most recent audiogram dated 12/10/2018 reveals normal thresholds with a 6K sensorineural notch large type B tympanogram in the right history of right tube insertion in the office on 11/1/2018    Hx of HFSNHL  Hearing normalized post tube insertion    At his last visit I recommended adenoidectomy and eustachian tube dilation if he required another T-tube    Symptoms currently include right ear plugging and popping he is tried Valsalva which leads to vertigo  Brittani noted an aural polyp on his recent exam in June.  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  Alert and oriented to person and place, interactive.  Head and Face - Normocephalic and atraumatic, with no gross asymmetry noted of the contour of the facial features.  The facial nerve is intact, with strong symmetric movements.  Eyes - Extraocular movements intact.   Ears- examined under microscopy bilaterally  External auditory canals are patent, left tympanic membrane is intact without effusion or worrisome retractions   Right tube is occluded, small aural polyp at base of tube, no otorrhea, no  retractions  Good mobility of TM with valsalva    No flex exam or rigid performed today  (Flex exam 11/1/18 After informed consent was obtained and the nose was anesthetized with topical neosynepherine/lidocaine, the scope was advanced into the nares.  The inferior and middle meatus are clear bilaterally there is a septal deviation to the left side with 70% obstruction inferior turbinate hypertrophy bilaterally.   eustachian tubes are narrowed bilaterally secondary to adenoid hypertrophy he has grade 2 adenoids and eustachian tube mucosa is edematous)         Impression/Plan  Sudarshan Skelton is a 29 year old male    ICD-10-CM    1. h/o myringotomy with insertion of tube, current non-funcitoning right tube Z96.22    2. Sensorineural hearing loss (SNHL) of both ears H90.3        Repeat audio prior to 4 month f/u with me    Tube should start to extrude, will follow with surveillance since he has no ear complaints and TM looks good    Isolated 6k SNHL, as long as no asymmetry can follow with q2 year audio      Paulette Guerrier D.O.  Otolaryngology/Head and Neck Surgery  Allergy        3 no additional today and had one recently    Again, thank you for allowing me to participate in the care of your patient.        Sincerely,        Paulette Guerrier MD

## 2019-07-11 NOTE — PATIENT INSTRUCTIONS
Thank you for allowing Dr. Guerrier and our ENT team to participate in your care.  If your medications are too expensive, please give the nurse a call.  We can possibly change this medication.  If you have a scheduling or an appointment question please contact our Health Unit Coordinator at their direct line [phone].   ALL nursing questions or concerns can be directed to your ENT nurse at: [phone] - [place]

## 2019-12-17 ENCOUNTER — OFFICE VISIT (OUTPATIENT)
Dept: OTOLARYNGOLOGY | Facility: OTHER | Age: 29
End: 2019-12-17
Attending: OTOLARYNGOLOGY
Payer: COMMERCIAL

## 2019-12-17 DIAGNOSIS — H65.491 CHRONIC OTITIS MEDIA WITH EFFUSION, RIGHT: Primary | ICD-10-CM

## 2019-12-17 PROCEDURE — G0463 HOSPITAL OUTPT CLINIC VISIT: HCPCS

## 2019-12-26 NOTE — PROGRESS NOTES
document embedded image  Patient Name: Sudarshan Skelton    Address: [address] [place] [address]     YOB: 1990    [place] [id number]    MR Number: [id number]    Phone: [phone]  PCP: NONE            Appointment Date: 12/17/19   Visit Provider: Franko Richards MD    cc: ~    ENT Progress Note    Visit Reasons: Right ear/fluid/polyp    HPI  History of Present Illness  Chief complaint:  Fluid ear on the right    History  The patient is a 29-year-old  whose had problems with lingering recurring fluid in his right ear.  He has had tubes placed a couple times in [place] only to extrude and redevelop is fluid problems.  Presents here today for assistance because of difficulties getting in to see his previous ear nose and throat.    Exam  The external auditory canals are clear bilaterally.  There is a tube lying just lateral to the drum on the right.  There's no obvious middle ear effusion on the right.  Tuning fork suggests conductive hearing loss on the right.  The left TM appears healthy in the tuning fork is normal.  Nasal-no obstruction or purulence  Neck-no masses or adenopathy  Head neck integument-Clear  General-the patient appears well and in no distress   neuro-there are no focal cranial nerve deficits    Home Medications     - Last Reconciled 12/18/19 by Anaya Robles CMA    No Home Medications        Allergies    grass pollen Adverse Reaction (Unverified 12/18/19 10:41)  Unknown    PFSH    PFSH:     Medical History (Updated 12/18/19 @ 10:42 by Anaya Robles CMA)    Asthma (Acute)  Diagnosis unknown (Acute)  no eCW History  Ear pressure (Acute)  Hearing loss (Acute)      Social History (Updated 12/18/19 @ 10:42 by Anaya Robles CMA)  Smoking Status:  Never smoker   second hand exposure:  No        A&P  Assessment & Plan  (1) Chronic otitis media with effusion:         Status: Acute        Code(s):  H65.499 - Other chronic nonsuppurative otitis media, unspecified ear         Qualifiers:          Laterality: right  Qualified Code(s): H65.491 - Other chronic nonsuppurative otitis media, right ear        I would advise tube placement on the right.  We'll make arrangements for this at his convenience in our [place] office.      Franko Richards MD              12/17/19 2051   <Electronically signed by Franko Richards MD> 12/24/19 7920

## 2021-03-01 ENCOUNTER — OFFICE VISIT (OUTPATIENT)
Dept: FAMILY MEDICINE | Facility: OTHER | Age: 31
End: 2021-03-01
Attending: FAMILY MEDICINE
Payer: COMMERCIAL

## 2021-03-01 VITALS
BODY MASS INDEX: 39.77 KG/M2 | TEMPERATURE: 97.7 F | HEART RATE: 86 BPM | SYSTOLIC BLOOD PRESSURE: 128 MMHG | DIASTOLIC BLOOD PRESSURE: 80 MMHG | WEIGHT: 293.6 LBS | RESPIRATION RATE: 20 BRPM | HEIGHT: 72 IN | OXYGEN SATURATION: 96 %

## 2021-03-01 DIAGNOSIS — H65.491 CHRONIC MEE (MIDDLE EAR EFFUSION), RIGHT: ICD-10-CM

## 2021-03-01 DIAGNOSIS — Z01.818 PRE-OP EXAM: Primary | ICD-10-CM

## 2021-03-01 PROBLEM — H93.91: Status: RESOLVED | Noted: 2021-03-01 | Resolved: 2021-03-01

## 2021-03-01 PROBLEM — H93.91: Status: ACTIVE | Noted: 2021-03-01

## 2021-03-01 PROCEDURE — 99202 OFFICE O/P NEW SF 15 MIN: CPT | Performed by: FAMILY MEDICINE

## 2021-03-01 SDOH — HEALTH STABILITY: MENTAL HEALTH: HOW OFTEN DO YOU HAVE 6 OR MORE DRINKS ON ONE OCCASION?: NOT ASKED

## 2021-03-01 SDOH — HEALTH STABILITY: MENTAL HEALTH: HOW OFTEN DO YOU HAVE A DRINK CONTAINING ALCOHOL?: NOT ASKED

## 2021-03-01 SDOH — HEALTH STABILITY: MENTAL HEALTH: HOW MANY DRINKS CONTAINING ALCOHOL DO YOU HAVE ON A TYPICAL DAY WHEN YOU ARE DRINKING?: NOT ASKED

## 2021-03-01 SDOH — HEALTH STABILITY: MENTAL HEALTH: HOW OFTEN DO YOU HAVE SIX OR MORE DRINKS ON ONE OCCASION?: NOT ASKED

## 2021-03-01 SDOH — HEALTH STABILITY: MENTAL HEALTH: HOW MANY STANDARD DRINKS CONTAINING ALCOHOL DO YOU HAVE ON A TYPICAL DAY?: NOT ASKED

## 2021-03-01 ASSESSMENT — ANXIETY QUESTIONNAIRES
6. BECOMING EASILY ANNOYED OR IRRITABLE: NOT AT ALL
1. FEELING NERVOUS, ANXIOUS, OR ON EDGE: NOT AT ALL
3. WORRYING TOO MUCH ABOUT DIFFERENT THINGS: NOT AT ALL
2. NOT BEING ABLE TO STOP OR CONTROL WORRYING: NOT AT ALL
5. BEING SO RESTLESS THAT IT IS HARD TO SIT STILL: NOT AT ALL
GAD7 TOTAL SCORE: 0
7. FEELING AFRAID AS IF SOMETHING AWFUL MIGHT HAPPEN: NOT AT ALL

## 2021-03-01 ASSESSMENT — PATIENT HEALTH QUESTIONNAIRE - PHQ9
5. POOR APPETITE OR OVEREATING: NOT AT ALL
SUM OF ALL RESPONSES TO PHQ QUESTIONS 1-9: 0

## 2021-03-01 ASSESSMENT — PAIN SCALES - GENERAL: PAINLEVEL: NO PAIN (0)

## 2021-03-01 ASSESSMENT — MIFFLIN-ST. JEOR: SCORE: 2324.76

## 2021-03-01 NOTE — NURSING NOTE
Patient here for preop for  03/04/2021 at [facility] . Medication Reconciliation: complete.    Anaya Hernandez LPN  3/1/2021 4:20 PM

## 2021-03-01 NOTE — PROGRESS NOTES
[facility] AND [facility]  [address]  [place] [address]  Phone: [phone]  Fax: [phone]  Primary Provider: No Ref-Primary, Physician  Pre-op Performing Provider: KATE JARRELL      PREOPERATIVE EVALUATION:  Today's date: 3/1/2021    Sudarshan Skelton is a 30 year old male who presents for a preoperative evaluation.    Surgical Information:  Surgery/Procedure: Tubes,  Eustachian dilation   Surgery Location: [facility]  Surgeon:    Surgery Date: 03/04/2021  Time of Surgery:   Where patient plans to recover: At home with family  Fax number for surgical facility:     Type of Anesthesia Anticipated: to be determined    Assessment & Plan     The proposed surgical procedure is considered LOW risk.    Pre-op exam      Chronic ERIS (middle ear effusion), right             Risks and Recommendations:  The patient has the following additional risks and recommendations for perioperative complications:   - No identified additional risk factors other than previously addressed        RECOMMENDATION:  APPROVAL GIVEN to proceed with proposed procedure, without further diagnostic evaluation.    Review of external notes as documented above                   Subjective     HPI related to upcoming procedure: Patient arrives here for preop.  He is scheduled to undergo vent tube placement and eustachian tube dilatation.  Approximately 4 years ago he was at home I and jumped off a estela.  He ruptured his right eardrum.  Since then he has been having trouble has had multiple tube placement.  He states that has been completely plugged since October.    Preop Questions 3/1/2021   1. Have you ever had a heart attack or stroke? No   2. Have you ever had surgery on your heart or blood vessels, such as a stent placement, a coronary artery bypass, or surgery on an artery in your head, neck, heart, or legs? No   3. Do you have chest pain with activity? No   4. Do you have a history of  heart failure? No   5.  Do you currently have a cold, bronchitis or symptoms of other infection? No   6. Do you have a cough, shortness of breath, or wheezing? No   7. Do you or anyone in your family have previous history of blood clots? No   8. Do you or does anyone in your family have a serious bleeding problem such as prolonged bleeding following surgeries or cuts? No   9. Have you ever had problems with anemia or been told to take iron pills? No   10. Have you had any abnormal blood loss such as black, tarry or bloody stools? No   11. Have you ever had a blood transfusion? No   12. Are you willing to have a blood transfusion if it is medically needed before, during, or after your surgery? Yes   13. Have you or any of your relatives ever had problems with anesthesia? No   14. Do you have sleep apnea, excessive snoring or daytime drowsiness? No   15. Do you have any artifical heart valves or other implanted medical devices like a pacemaker, defibrillator, or continuous glucose monitor? No   16. Do you have artificial joints? No   17. Are you allergic to latex? No       Health Care Directive:  Patient does not have a Health Care Directive or Living Will: Discussed advance care planning with patient; however, patient declined at this time.    Preoperative Review of :   reviewed - no record of controlled substances prescribed.          Review of Systems  CONSTITUTIONAL: NEGATIVE for fever, chills, change in weight  ENT/MOUTH: NEGATIVE for ear, mouth and throat problems  RESP: NEGATIVE for significant cough or SOB  CV: NEGATIVE for chest pain, palpitations or peripheral edema    There are no active problems to display for this patient.     Past Medical History:   Diagnosis Date     Exercise-induced asthma 2001     Past Surgical History:   Procedure Laterality Date     APPENDECTOMY  1999     C REPAIR CRUCIATE LIGAMENT,KNEE  2002     No current outpatient medications on file.       No Known Allergies     Social History     Tobacco Use      Smoking status: Never Smoker     Smokeless tobacco: Never Used   Substance Use Topics     Alcohol use: Not Currently     No family history on file.  History   Drug Use Unknown         Objective     /80   Pulse 86   Temp 97.7  F (36.5  C)   Resp 20   Ht 1.829 m (6')   Wt 133.2 kg (293 lb 9.6 oz)   SpO2 96%   BMI 39.82 kg/m      Physical Exam  GENERAL APPEARANCE: healthy, alert and no distress  HENT: rt tm retracted tympanosclerosis present  RESP: lungs clear to auscultation - no rales, rhonchi or wheezes  CV: regular rate and rhythm, normal S1 S2, no S3 or S4 and no murmur, click or rub   ABDOMEN: soft, nontender, no HSM or masses and bowel sounds normal  NEURO: Normal strength and tone, sensory exam grossly normal, mentation intact and speech normal    No results for input(s): HGB, PLT, INR, NA, POTASSIUM, CR, A1C in the last 59744 hours.     Diagnostics:  No labs were ordered during this visit.   No EKG required for low risk surgery (cataract, skin procedure, breast biopsy, etc).    Revised Cardiac Risk Index (RCRI):  The patient has the following serious cardiovascular risks for perioperative complications:   - No serious cardiac risks = 0 points     RCRI Interpretation: 0 points: Class I (very low risk - 0.4% complication rate)             Signed Electronically by: Timbo Gonsalez MD  Copy of this evaluation report is provided to requesting physician.    [facility] Guidelines    Revised Cardiac Risk Index

## 2021-03-02 ASSESSMENT — ANXIETY QUESTIONNAIRES: GAD7 TOTAL SCORE: 0

## 2021-03-06 ENCOUNTER — HEALTH MAINTENANCE LETTER (OUTPATIENT)
Age: 31
End: 2021-03-06

## 2021-03-16 ENCOUNTER — OFFICE VISIT (OUTPATIENT)
Dept: OTOLARYNGOLOGY | Facility: OTHER | Age: 31
End: 2021-03-16
Attending: OTOLARYNGOLOGY
Payer: COMMERCIAL

## 2021-03-16 DIAGNOSIS — Z09 POSTOP CHECK: Primary | ICD-10-CM

## 2021-03-16 PROCEDURE — G0463 HOSPITAL OUTPT CLINIC VISIT: HCPCS

## 2021-03-26 NOTE — PROGRESS NOTES
document embedded image  Patient Name: Sudarshan Skelton    Address: [address] [place] [address]     YOB: 1990    [place] [id number]    MR Number: [id number]    Phone: [phone]  PCP: UNKNOWN            Appointment Date: 03/16/21   Visit Provider: Franko Richards MD    cc: UNKNOWN; ~    ENT Progress Note  Visit Reasons: Post op place of right tube/balloon    HPI  History of Present Illness  Chief complaint:  Postop check    History  The patient is a 30-year-old male who recently underwent T-tube placement on the right with right eustachian tube balloon dilation.  He had a little discomfort in his nose is with a little bit of bleeding following the procedure.  This is resolved quickly.  He is very pleased with his improved hearing at this point.    Exam  Nasal-there is no evidence of lingering abrasion or lesion in either side of his nose  The external auditory canals are clear.  The T tubes in place and patent on the right.    Home Medications     - Last Reconciled 03/17/21 by Anaya Robles CMA    No Home Medications        Allergies    grass pollen Adverse Reaction (Unverified 03/17/21 10:03)  Unknown    PFSH  PFSH:     Medical History (Reviewed 03/17/21 @ 10:03 by Anaya Robles CMA)    Asthma  Diagnosis unknown  no eCW History  Ear pressure  Hearing loss     Surgical History (Updated 03/17/21 @ 10:04 by Anaya Robles CMA)    History of ear surgery  History of placement of ear tubes  History of repair of ACL  Hx of appendectomy     Social History (Reviewed 02/24/21 @ 14:21 by Roberto Solomon CMA)  Smoking Status:  Never smoker  second hand exposure:  No  alcohol intake:  never  substance use type:  does not use       A&P  Assessment & Plan  (1) Postop check:        Status: Acute        Code(s):  Z09 - Encounter for follow-up examination after completed treatment for conditions other than malignant neoplasm  Additional Plan Details  Plan Details: He will see me yearly for T-tube checks.  We will leave  the T-tube for no longer than 3 years.      Franko Richards MD    03/16/21 0819    <Electronically signed by Franko Richards MD> 03/17/21 3907

## 2021-07-21 ENCOUNTER — OFFICE VISIT (OUTPATIENT)
Dept: FAMILY MEDICINE | Facility: OTHER | Age: 31
End: 2021-07-21
Attending: FAMILY MEDICINE
Payer: COMMERCIAL

## 2021-07-21 VITALS
SYSTOLIC BLOOD PRESSURE: 120 MMHG | OXYGEN SATURATION: 96 % | RESPIRATION RATE: 16 BRPM | TEMPERATURE: 97 F | DIASTOLIC BLOOD PRESSURE: 84 MMHG | BODY MASS INDEX: 39.74 KG/M2 | HEART RATE: 75 BPM | WEIGHT: 293 LBS

## 2021-07-21 DIAGNOSIS — R23.4 THICKENED SKIN OF PALMS AND SOLES: Primary | ICD-10-CM

## 2021-07-21 PROCEDURE — 99213 OFFICE O/P EST LOW 20 MIN: CPT | Performed by: FAMILY MEDICINE

## 2021-07-21 RX ORDER — CLOTRIMAZOLE 1 %
CREAM (GRAM) TOPICAL 2 TIMES DAILY
Qty: 60 G | Refills: 2 | Status: SHIPPED | OUTPATIENT
Start: 2021-07-21 | End: 2024-02-28

## 2021-07-21 RX ORDER — AMMONIUM LACTATE 12 G/100G
CREAM TOPICAL 2 TIMES DAILY
Qty: 385 G | Refills: 2 | Status: SHIPPED | OUTPATIENT
Start: 2021-07-21 | End: 2024-02-28

## 2021-07-21 ASSESSMENT — PAIN SCALES - GENERAL: PAINLEVEL: NO PAIN (0)

## 2021-07-21 NOTE — PROGRESS NOTES
SUBJECTIVE:   Sudarshan Skelton is a 31 year old male who presents to clinic today for the following health issues:    HPI  Sudarshan is here for thickened skin on heels B; questioning if he needs a referral to Dermatology.  Worsening since Covid-19 pandemic; as he was previously getting pedicures to help keep thickened skin more under control.  Has tried heel socks at home with lotion overnight, pumice stones, o'yovani's working hands lotion and multiple other types of moisturizers.   Has had for a total of ~2 years or so.  Worst it has been is now; can occasionally cause some discomfort if walking barefoot at home.  Otherwise does not bother him when he is in socks/shoes.    Recently had ear procedure.  Having a couple episodes of popping and wanting to make sure tube is okay.    Patient Active Problem List    Diagnosis Date Noted     Chronic ERIS (middle ear effusion), right 03/01/2021     Priority: Medium     Past Medical History:   Diagnosis Date     Exercise-induced asthma 2001      Past Surgical History:   Procedure Laterality Date     APPENDECTOMY  1999     C REPAIR CRUCIATE LIGAMENT,KNEE  2002     No family history on file.  Social History     Tobacco Use     Smoking status: Never Smoker     Smokeless tobacco: Never Used   Substance Use Topics     Alcohol use: Not Currently     Social History     Social History Narrative     Not on file     Current Outpatient Medications   Medication Sig Dispense Refill     ammonium lactate (AMLACTIN) 12 % external cream Apply topically 2 times daily 385 g 2     clotrimazole (LOTRIMIN) 1 % external cream Apply topically 2 times daily Once skin condition is improving 60 g 2     No Known Allergies    OBJECTIVE:     /84   Pulse 75   Temp 97  F (36.1  C) (Tympanic)   Resp 16   Wt 132.9 kg (293 lb)   SpO2 96%   BMI 39.74 kg/m    Body mass index is 39.74 kg/m .  Physical Exam  Vitals and nursing note reviewed.   Constitutional:       Appearance: Normal appearance.   HENT:       Head: Normocephalic and atraumatic.      Ears:      Comments: Chronic thickening/scarring of R TM; but blue T-tube in place and appears to be functioning without difficulty.  Musculoskeletal:        Feet:    Feet:      Right foot:      Skin integrity: Callus, dry skin and fissure present.      Left foot:      Skin integrity: Callus, dry skin and fissure present.   Neurological:      Mental Status: He is alert.     Diagnostic Test Results:  No results found for any visits on 07/21/21.    ASSESSMENT/PLAN:     1. Thickened skin of palms and soles  Rx for amlactin trial x 1 month and monitor for any improvement.  Would Rx clotrimazole to use after 1 month to cover for any fungal elements contributing to symptoms.  OK to continue heel socks/moisturizer and pumice stone.   If not improving; would entertain referral to Dermatology.  - ammonium lactate (AMLACTIN) 12 % external cream; Apply topically 2 times daily  Dispense: 385 g; Refill: 2  - clotrimazole (LOTRIMIN) 1 % external cream; Apply topically 2 times daily Once skin condition is improving  Dispense: 60 g; Refill: 2    Reassurance re: ear exam.    Amanda Reed, [facility] AND [facility]

## 2021-07-21 NOTE — NURSING NOTE
Chief Complaint   Patient presents with     Derm Problem     cracked heels     Patient presents today as he has cracked heels and would like a referral.   Initial /84   Pulse 75   Temp 97  F (36.1  C) (Tympanic)   Resp 16   Wt 132.9 kg (293 lb)   SpO2 96%   BMI 39.74 kg/m   Estimated body mass index is 39.74 kg/m  as calculated from the following:    Height as of 3/1/21: 1.829 m (6').    Weight as of this encounter: 132.9 kg (293 lb).  Medication Reconciliation: complete    Laina Parra LPN

## 2021-10-09 ENCOUNTER — HEALTH MAINTENANCE LETTER (OUTPATIENT)
Age: 31
End: 2021-10-09

## 2022-02-17 ENCOUNTER — MYC MEDICAL ADVICE (OUTPATIENT)
Dept: FAMILY MEDICINE | Facility: OTHER | Age: 32
End: 2022-02-17
Payer: COMMERCIAL

## 2022-02-21 ENCOUNTER — OFFICE VISIT (OUTPATIENT)
Dept: FAMILY MEDICINE | Facility: OTHER | Age: 32
End: 2022-02-21
Attending: FAMILY MEDICINE
Payer: COMMERCIAL

## 2022-02-21 VITALS
BODY MASS INDEX: 38.17 KG/M2 | SYSTOLIC BLOOD PRESSURE: 126 MMHG | TEMPERATURE: 97.8 F | RESPIRATION RATE: 20 BRPM | DIASTOLIC BLOOD PRESSURE: 80 MMHG | HEART RATE: 72 BPM | HEIGHT: 72 IN | OXYGEN SATURATION: 97 % | WEIGHT: 281.8 LBS

## 2022-02-21 DIAGNOSIS — R05.9 COUGH: Primary | ICD-10-CM

## 2022-02-21 PROCEDURE — 99213 OFFICE O/P EST LOW 20 MIN: CPT | Performed by: FAMILY MEDICINE

## 2022-02-21 RX ORDER — AZITHROMYCIN 250 MG/1
TABLET, FILM COATED ORAL
Qty: 6 TABLET | Refills: 0 | Status: SHIPPED | OUTPATIENT
Start: 2022-02-21 | End: 2022-02-26

## 2022-02-21 ASSESSMENT — PAIN SCALES - GENERAL: PAINLEVEL: NO PAIN (0)

## 2022-02-21 NOTE — NURSING NOTE
Patient presents today for cough that started over a week ago.    Medication Reconciliation Complete    Guillermina Coello LPN  2/21/2022 8:45 AM

## 2022-02-21 NOTE — PROGRESS NOTES
SUBJECTIVE:  Sudarshan Skelton is a 31 year old male here for chest congestion and cough.  He has had the symptoms for approximate 10 days.  He said no fevers or chills.  He was diagnosed with COVID earlier in January but his symptoms had resolved afterwards.  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  No sore throat, ear pain, sinus congestion.  He has felt some wheezing.    Allergies:  No Known Allergies    ROS:    As above otherwise ROS is unremarkable.    OBJECTIVE:  /80   Pulse 72   Temp 97.8  F (36.6  C)   Resp 20   Ht 1.829 m (6')   Wt 127.8 kg (281 lb 12.8 oz)   SpO2 97%   BMI 38.22 kg/m      EXAM:  General Appearance: Pleasant, alert, appropriate appearance for age. No acute distress  Head: Normal. Normocephalic, atraumatic.  Eyes: PERRL, EOMI  Ears: Right tympanic membrane has a T-tube in place, no drainage.  Left hepatic membrane is normal.  OroPharynx: Dental hygiene adequate. Normal buccal mucosa. Normal pharynx.  Neck: Supple, no masses or nodes, no lymphadenopathy.  No thyromegaly.  Lungs: Normal chest wall and respirations. Clear to auscultation, no wheezes or crackles.  Cardiovascular: Regular rate and rhythm. S1, S2, no murmurs.  Skin: no concerning or new rashes.  Neurologic Exam: CN 2-12 grossly intact.    Psychiatric Exam: Alert and oriented, appropriate affect.    ASSESSEMENT AND PLAN:    1. Cough      Exam is relatively unremarkable.  Discussed that this is likely viral.  I did send a prescription for azithromycin so if his symptoms continue he can take that.  We discussed Intermatic care.  Follow-up as needed.    Praneeth Shepherd MD  Family Medicine

## 2022-03-26 ENCOUNTER — HEALTH MAINTENANCE LETTER (OUTPATIENT)
Age: 32
End: 2022-03-26

## 2022-09-17 ENCOUNTER — HEALTH MAINTENANCE LETTER (OUTPATIENT)
Age: 32
End: 2022-09-17

## 2022-11-01 ENCOUNTER — OFFICE VISIT (OUTPATIENT)
Dept: OTOLARYNGOLOGY | Facility: OTHER | Age: 32
End: 2022-11-01
Attending: OTOLARYNGOLOGY
Payer: COMMERCIAL

## 2022-11-01 DIAGNOSIS — Z45.89 TYMPANOSTOMY TUBE CHECK: Primary | ICD-10-CM

## 2022-11-01 PROCEDURE — G0463 HOSPITAL OUTPT CLINIC VISIT: HCPCS

## 2022-11-01 NOTE — NURSING NOTE
Patient here to see ENT for Right ear plugged, decreased hearing  Krystin Guy LPN ..........11/1/2022 2:03 PM

## 2022-11-09 NOTE — PROGRESS NOTES
document embedded image  Patient Name: Sudarshan Seklton    Address: [address] [place] [address]     YOB: 1990    [place] [id number]    MR Number: [id number]    Phone: [phone]  PCP: UNKNOWN            Appointment Date: 11/01/22   Visit Provider: Franko Richards MD    cc: UNKNOWN; ~    ENT Progress Note  Intake  Visit Reasons: Plugged rt ear /decreased hearing    HPI  History of Present Illness  Chief complaint:  Tube check    History  The patient is a 32-year-old man who in March of 2021 underwent T-tube placement and eustachian tube balloon dilation on.  His ear been healthy until about a month ago when he developed some drainage and pressure following an upper respiratory tract infection.  The drainage in discomfort has resolved in the ear is back to normal at this.    Exam  The external auditory canals are clear bilaterally.  The T-tube remains in place and patent on the right.  The eardrum on the left is intact and healthy.  Remainder of the head neck exam is unremarkable    Allergies    grass pollen Adverse Reaction (Unverified 11/02/22 10:25)  Unknown    PFSH  PFSH:   Past Medical History: (Reviewed 11/02/22 @ 10:25 by Anaya Robles, Med Assist)    Asthma  Diagnosis unknown  no eCW History  Ear pressure  Hearing loss    Past Surgical History: (Reviewed 11/02/22 @ 10:25 by Anaya Robles Med Assist)    History of ear surgery  History of placement of ear tubes  History of repair of ACL  Hx of appendectomy     Social History: (Reviewed 11/02/22 @ 10:25 by Anaya Robles Med Assist)  Smoking Status:  Never smoker  second hand exposure:  No  alcohol intake:  never  substance use type:  does not use       A&P  Assessment & Plan  (1) Tympanostomy tube check:        Status: Acute        Code(s):  Z45.89 - Encounter for adjustment and management of other implanted devices  The patient was counseled that he should be seen in 1 year at which point we will remove the tube and hopefully get his eardrum to  heal well.  And hopefully his eustachian tube function will be improved following is balloon dilation.  He is welcome to call for drops if he sees further drainage.      Franko Richards MD    11/01/22 0963    <Electronically signed by Franko Richards MD> 11/02/22 3169

## 2023-04-05 ENCOUNTER — OFFICE VISIT (OUTPATIENT)
Dept: FAMILY MEDICINE | Facility: OTHER | Age: 33
End: 2023-04-05
Attending: PHYSICIAN ASSISTANT
Payer: COMMERCIAL

## 2023-04-05 VITALS
SYSTOLIC BLOOD PRESSURE: 130 MMHG | WEIGHT: 295 LBS | RESPIRATION RATE: 18 BRPM | BODY MASS INDEX: 39.96 KG/M2 | TEMPERATURE: 98 F | HEART RATE: 90 BPM | OXYGEN SATURATION: 97 % | DIASTOLIC BLOOD PRESSURE: 84 MMHG | HEIGHT: 72 IN

## 2023-04-05 DIAGNOSIS — H10.023 PINK EYE DISEASE OF BOTH EYES: ICD-10-CM

## 2023-04-05 DIAGNOSIS — J02.9 SORE THROAT: ICD-10-CM

## 2023-04-05 DIAGNOSIS — R09.82 POST-NASAL DISCHARGE: Primary | ICD-10-CM

## 2023-04-05 LAB — GROUP A STREP BY PCR: NOT DETECTED

## 2023-04-05 PROCEDURE — 87651 STREP A DNA AMP PROBE: CPT | Mod: ZL | Performed by: PHYSICIAN ASSISTANT

## 2023-04-05 PROCEDURE — 99213 OFFICE O/P EST LOW 20 MIN: CPT | Performed by: PHYSICIAN ASSISTANT

## 2023-04-05 RX ORDER — POLYMYXIN B SULFATE AND TRIMETHOPRIM 1; 10000 MG/ML; [USP'U]/ML
1-2 SOLUTION OPHTHALMIC EVERY 6 HOURS
Qty: 2 ML | Refills: 0 | Status: SHIPPED | OUTPATIENT
Start: 2023-04-05 | End: 2023-04-10

## 2023-04-05 RX ORDER — FLUTICASONE PROPIONATE 50 MCG
1 SPRAY, SUSPENSION (ML) NASAL 2 TIMES DAILY
Qty: 11.1 ML | Refills: 0 | Status: SHIPPED | OUTPATIENT
Start: 2023-04-05 | End: 2024-02-28

## 2023-04-05 ASSESSMENT — ENCOUNTER SYMPTOMS
SORE THROAT: 1
COUGH: 1

## 2023-04-05 ASSESSMENT — PAIN SCALES - GENERAL: PAINLEVEL: MILD PAIN (3)

## 2023-04-05 NOTE — PROGRESS NOTES
Assessment & Plan     1. Post-nasal discharge  - fluticasone (FLONASE) 50 MCG/ACT nasal spray; Spray 1 spray into both nostrils 2 times daily  Dispense: 11.1 mL; Refill: 0  -Prominent postnasal drip, refill Flonase provided.  -Recommend: increased fluids, rest, use of humidifier, antitussives (cough medication for adults), alternating tylenol and ibuprofen every 4-6 hours as needed (if able to take these medications), salt water gargles for sore throats or throat lozenges, honey, netti pots/saline rinses for sinus/congestion, as well as other home remedies.     2. Pink eye disease of both eyes  - trimethoprim-polymyxin b (POLYTRIM) 72812-3.1 UNIT/ML-% ophthalmic solution; Place 1-2 drops into both eyes every 6 hours for 5 days  Dispense: 2 mL; Refill: 0  -Eye drops/ointment as prescribed  -In regards to antibiotic drops/ointment, please use as directed (wash hands before putting in eye).   -Avoid touching the eye, as conjunctivitis/pink eye, is very contagious.   -Wash your hands regularly before touching your eye, if you must touch it. Wash your pillow case daily or every other day until symptoms clear up.   -Do not share eye drops (or ointment) with other individuals.   -Warm compresses are recommended as needed.   -Patient is in agreement and understanding of the above treatment plan. All questions and concerns were addressed and answered to patient's satisfaction. AVS reviewed with patient.     3. Sore throat  - Group A Streptococcus PCR Throat Swab  - Strep test returned negative, sore throat is most consistent with viral etiology/cause at this point in time.  Recommend to continue with symptomatic remedies including but not limited to: Salt water gargles, throat lozenges, soups/popsicles, increase hydration, alternating Tylenol and ibuprofen if needed/able and other symptomatic remedies.    Return if symptoms worsen or fail to improve.    Itzel Crawford PA-C  [facility]    Subjective    Sudarshan is a 32 year old male, presenting for the following health issues:  Pharyngitis, Conjunctivitis, and Cough         View : No data to display.              Pharyngitis   Associated symptoms include cough.   Conjunctivitis  Associated symptoms include coughing and a sore throat.   Cough  Associated symptoms include sore throat.   History of Present Illness       Reason for visit:  Pink eye and bronchitus  Symptom onset:  1-2 weeks ago    He eats 2-3 servings of fruits and vegetables daily.He consumes 1 sweetened beverage(s) daily.He exercises with enough effort to increase his heart rate 20 to 29 minutes per day.  He exercises with enough effort to increase his heart rate 3 or less days per week.   He is taking medications regularly.       Patient presenting with his wife and 2 daughters for evaluation of pinkeye.  His youngest daughter was diagnosed with pinkeye approximately 1 week ago, both children had exposure to pinkeye at .  Patient's wife is a teacher and he works as a principal at a local school, unknown exposure to other illnesses but do note that there have been many sick children in the classrooms.    Eye Sx: discharge/drainage, mattering, redness  Problem/Context: Pink eye exposure    Contact or glasses use: YES, glasses only  Changes in vision: No  Change in eye ROM: No  Presence of Flashers/Floaters: No  Discharge description: goopy  Presence of URI Symptoms: YES, ongoing cough  Eyelid (any symptoms): No  Any dental or jaw pain: No  Any exposure to trauma or chemical burns to eye: No    Treatments Tried: washing/wiping eyes    Prior eye or sinus surgeries: No  Similar symptoms in the past: No    Review of Systems   HENT: Positive for sore throat.    Respiratory: Positive for cough.       Constitutional, HEENT, cardiovascular, pulmonary, GI, , musculoskeletal, neuro, skin, endocrine and psych systems are negative, except as otherwise noted.      Objective    /84 (BP Location: Left  arm, Patient Position: Chair, Cuff Size: Adult Large)   Pulse 90   Temp 98  F (36.7  C) (Tympanic)   Resp 18   Ht 1.829 m (6')   Wt 133.8 kg (295 lb)   SpO2 97%   BMI 40.01 kg/m    Body mass index is 40.01 kg/m .  Physical Exam   GENERAL: healthy, alert and no distress  EYES: Mild erythema and mattering to bilateral upper and lower lash lines as well as lid margins, PERRL normal  HENT: normal cephalic/atraumatic, ear canals and TM's normal, nose and mouth without ulcers or lesions, oropharynx clear, oral mucous membranes moist and tonsillar erythema. + post nasal drip  NECK: no adenopathy, no asymmetry, masses, or scars and thyroid normal to palpation  RESP: lungs clear to auscultation - no rales, rhonchi or wheezes  CV: regular rate and rhythm, normal S1 S2, no S3 or S4, no murmur, click or rub, no peripheral edema and peripheral pulses strong  ABDOMEN: soft, nontender, no hepatosplenomegaly, no masses and bowel sounds normal  PSYCH: mentation appears normal, affect normal/bright  LYMPH: normal ant/post cervical, supraclavicular nodes    Results for orders placed or performed in visit on 04/05/23   Group A Streptococcus PCR Throat Swab     Status: Normal    Specimen: Throat; Swab   Result Value Ref Range    Group A strep by PCR Not Detected Not Detected    Narrative    The Xpert Xpress Strep A test, performed on the [employer], is a rapid, qualitative in vitro diagnostic test for the detection of Streptococcus pyogenes (Group A ß-hemolytic Streptococcus, Strep A) in throat swab specimens from patients with signs and symptoms of pharyngitis. The Xpert Xpress Strep A test can be used as an aid in the diagnosis of Group A Streptococcal pharyngitis. The assay is not intended to monitor treatment for Group A Streptococcus infections. The Xpert Xpress Strep A test utilizes an automated real-time polymerase chain reaction (PCR) to detect Streptococcus pyogenes DNA.

## 2023-04-05 NOTE — NURSING NOTE
Chief Complaint   Patient presents with     Pharyngitis     Conjunctivitis     Cough       Initial /84 (BP Location: Left arm, Patient Position: Chair, Cuff Size: Adult Large)   Pulse 90   Temp 98  F (36.7  C) (Tympanic)   Resp 18   Ht 1.829 m (6')   Wt 133.8 kg (295 lb)   SpO2 97%   BMI 40.01 kg/m   Estimated body mass index is 40.01 kg/m  as calculated from the following:    Height as of this encounter: 1.829 m (6').    Weight as of this encounter: 133.8 kg (295 lb).  Medication Reconciliation: complete      FOOD SECURITY SCREENING QUESTIONS:    The next two questions are to help us understand your food security.  If you are feeling you need any assistance in this area, we have resources available to support you today.    Hunger Vital Signs:  Within the past 12 months we worried whether our food would run out before we got money to buy more. Never  Within the past 12 months the food we bought just didn't last and we didn't have money to get more. Never  Geneva Beth LPN on 4/5/2023 at 1:34 PM

## 2023-05-06 ENCOUNTER — HEALTH MAINTENANCE LETTER (OUTPATIENT)
Age: 33
End: 2023-05-06

## 2023-10-05 ENCOUNTER — OFFICE VISIT (OUTPATIENT)
Dept: FAMILY MEDICINE | Facility: OTHER | Age: 33
End: 2023-10-05
Attending: FAMILY MEDICINE
Payer: COMMERCIAL

## 2023-10-05 VITALS
HEART RATE: 78 BPM | RESPIRATION RATE: 16 BRPM | BODY MASS INDEX: 40.28 KG/M2 | SYSTOLIC BLOOD PRESSURE: 122 MMHG | WEIGHT: 297 LBS | DIASTOLIC BLOOD PRESSURE: 80 MMHG | TEMPERATURE: 97.9 F | OXYGEN SATURATION: 96 %

## 2023-10-05 DIAGNOSIS — H66.41 SUPPURATIVE OTITIS MEDIA OF RIGHT EAR, UNSPECIFIED CHRONICITY: Primary | ICD-10-CM

## 2023-10-05 PROCEDURE — 99213 OFFICE O/P EST LOW 20 MIN: CPT | Performed by: FAMILY MEDICINE

## 2023-10-05 RX ORDER — OFLOXACIN 3 MG/ML
10 SOLUTION AURICULAR (OTIC) 2 TIMES DAILY
Qty: 14 ML | Refills: 0 | Status: SHIPPED | OUTPATIENT
Start: 2023-10-05 | End: 2024-05-23

## 2023-10-05 ASSESSMENT — PAIN SCALES - GENERAL: PAINLEVEL: NO PAIN (0)

## 2023-10-05 NOTE — PROGRESS NOTES
Nursing Notes:   Jessica Romano LPN  10/5/2023  2:20 PM  Signed  Chief Complaint   Patient presents with    Ear Problem         Medication Reconciliation: complete    Jessica JOHNSONDerrek Umaamy ANITA          Charly Heaton is a 33 year old, presenting for the following health issues:  Ear Problem      10/5/2023     2:19 PM   Additional Questions   Roomed by Jessica Romano     Sudarshan is here today for right ear drainage.  Started with a cold about 5 weeks ago.  Settled in his right ear with plugging.  About a week ago, yellow pus started draining.  He tried decongestants, etc prior to this.  Has had issues with this previously.  Has seen ENT (Dr. Richards) for this in the past.  Has dried up a little, but still not gone.  Has had a little productive cough in the past couple days.  Has a chronic right T-Tube due to congenitally narrow eustachian tubes.  Had an old prescription for ciprodex which he tried a couple of d doses of, but does not have enough to treat fully.        History of Present Illness       Reason for visit:  Plugged ear with discharge    He eats 2-3 servings of fruits and vegetables daily.He consumes 0 sweetened beverage(s) daily.He exercises with enough effort to increase his heart rate 20 to 29 minutes per day.  He exercises with enough effort to increase his heart rate 3 or less days per week.   He is taking medications regularly.         Ear Pain         Review of Systems   HENT:  Positive for ear pain.       Constitutional, HEENT, cardiovascular, pulmonary, GI, , musculoskeletal, neuro, skin, endocrine and psych systems are negative, except as otherwise noted.      Objective    Pulse 78   Temp 97.9  F (36.6  C) (Tympanic)   Resp 16   Wt 134.7 kg (297 lb)   SpO2 96%   BMI 40.28 kg/m    Body mass index is 40.28 kg/m .  Physical Exam  Constitutional:       Appearance: Normal appearance.   HENT:      Head: Normocephalic.      Ears:      Comments: Left TM is pearly with normal light reflex.  Right TM is  partially obscured by purulent drainage.  T-tube is visualized.     Nose: Nose normal. No congestion or rhinorrhea.      Mouth/Throat:      Mouth: Mucous membranes are moist.      Pharynx: Oropharynx is clear. No oropharyngeal exudate or posterior oropharyngeal erythema.   Eyes:      Extraocular Movements: Extraocular movements intact.      Pupils: Pupils are equal, round, and reactive to light.   Cardiovascular:      Rate and Rhythm: Normal rate and regular rhythm.      Heart sounds: Normal heart sounds. No murmur heard.  Pulmonary:      Effort: Pulmonary effort is normal.      Breath sounds: Normal breath sounds. No wheezing, rhonchi or rales.   Musculoskeletal:      Cervical back: Normal range of motion and neck supple.   Lymphadenopathy:      Cervical: No cervical adenopathy.   Neurological:      Mental Status: He is alert.   Psychiatric:         Mood and Affect: Mood normal.         Behavior: Behavior normal.        Assessment & Plan     ICD-10-CM    1. Suppurative otitis media of right ear, unspecified chronicity  H66.41 ofloxacin (FLOXIN) 0.3 % otic solution          Treat with ofloxacin as above.  If not improving over the next couple weeks, would recommend referral back to ENT.  He will let me know if he would need a referral.  He has seen Dr. Richards in the past.    Encouraged regular exercise.     No follow-ups on file.    Shanita Alvarenga MD  [facility] AND [facility]

## 2023-10-05 NOTE — NURSING NOTE
Chief Complaint   Patient presents with    Ear Problem         Medication Reconciliation: complete    Jessica Romano, LPN

## 2024-02-28 ENCOUNTER — HOSPITAL ENCOUNTER (OUTPATIENT)
Dept: GENERAL RADIOLOGY | Facility: OTHER | Age: 34
Discharge: HOME | End: 2024-02-28
Attending: FAMILY MEDICINE
Payer: COMMERCIAL

## 2024-02-28 ENCOUNTER — OFFICE VISIT (OUTPATIENT)
Dept: FAMILY MEDICINE | Facility: OTHER | Age: 34
End: 2024-02-28
Attending: FAMILY MEDICINE
Payer: COMMERCIAL

## 2024-02-28 VITALS
BODY MASS INDEX: 40.42 KG/M2 | HEART RATE: 80 BPM | SYSTOLIC BLOOD PRESSURE: 144 MMHG | DIASTOLIC BLOOD PRESSURE: 84 MMHG | TEMPERATURE: 97.3 F | RESPIRATION RATE: 20 BRPM | WEIGHT: 298 LBS | OXYGEN SATURATION: 97 %

## 2024-02-28 DIAGNOSIS — R05.3 CHRONIC COUGH: ICD-10-CM

## 2024-02-28 DIAGNOSIS — R05.3 CHRONIC COUGH: Primary | ICD-10-CM

## 2024-02-28 PROCEDURE — 99213 OFFICE O/P EST LOW 20 MIN: CPT | Performed by: FAMILY MEDICINE

## 2024-02-28 PROCEDURE — 71046 X-RAY EXAM CHEST 2 VIEWS: CPT

## 2024-02-28 NOTE — NURSING NOTE
Patient here for continuous cough for the past 3 months after positive for COVID. Medication Reconciliation: complete.    Anaya Hernandez LPN  2/28/2024 9:23 AM

## 2024-02-29 ASSESSMENT — ENCOUNTER SYMPTOMS: COUGH: 1

## 2024-02-29 NOTE — PROGRESS NOTES
SUBJECTIVE:   Sudarshan Skelton is a 33 year old male who presents to clinic today for the following health issues: Wheezing cough    Patient arrives here for wheezing and cough.  This is been going on since December.  He has been seen and was told that it is viral bronchitis.  States he feels it is getting a little bit better over the last couple days.  But continues to have a dry cough.  Started with getting COVID    Cough    History of Present Illness       Reason for visit:  Got covid in early december. Tested negative and still coughing 3 montha later  Symptom onset:  More than a month  Symptoms include:  Cough,coughing fits  Symptom intensity:  Moderate  Symptom progression:  Improving  Had these symptoms before:  No  What makes it worse:  No  What makes it better:  Cough suppressants/cough drops    He eats 2-3 servings of fruits and vegetables daily.He consumes 1 sweetened beverage(s) daily.He exercises with enough effort to increase his heart rate 20 to 29 minutes per day.  He exercises with enough effort to increase his heart rate 3 or less days per week.   He is taking medications regularly.        Patient Active Problem List    Diagnosis Date Noted    Chronic ERIS (middle ear effusion), right 03/01/2021     Priority: Medium     Past Medical History:   Diagnosis Date    Exercise-induced asthma 2001        Review of Systems   Respiratory:  Positive for cough.         OBJECTIVE:     BP (!) 144/84 (BP Location: Right arm)   Pulse 80   Temp 97.3  F (36.3  C)   Resp 20   Wt 135.2 kg (298 lb)   SpO2 97%   BMI 40.42 kg/m    Body mass index is 40.42 kg/m .  Physical Exam  Constitutional:       Appearance: Normal appearance.   Pulmonary:      Effort: Pulmonary effort is normal.      Breath sounds: Normal breath sounds.   Neurological:      Mental Status: He is alert.   Psychiatric:         Mood and Affect: Mood normal.         Thought Content: Thought content normal.         Diagnostic Test Results: Chest x-ray  unremarkable    ASSESSMENT/PLAN:         (R05.3) Chronic cough  (primary encounter diagnosis)  Comment:   Plan: XR Chest 2 Views        Likely postviral cough.  Discussed inhalers prednisone cough suppressants.  Patient reports that he feels he is getting better.  And would just like to monitor for now  Also discussed his elevated blood pressure.  Patient indicates that he and his wife plan on getting a diet get better exercise.  Hopefully his blood pressure will come down.  Discussed his goal should be less than 130/80.  He will take blood pressures at home.  Follow-up if blood pressures consistently above 130/80      Timbo Gonsalez MD  [facility] AND [facility]

## 2024-05-23 ENCOUNTER — OFFICE VISIT (OUTPATIENT)
Dept: INTERNAL MEDICINE | Facility: OTHER | Age: 34
End: 2024-05-23
Attending: INTERNAL MEDICINE
Payer: COMMERCIAL

## 2024-05-23 VITALS
WEIGHT: 303.8 LBS | OXYGEN SATURATION: 96 % | SYSTOLIC BLOOD PRESSURE: 130 MMHG | DIASTOLIC BLOOD PRESSURE: 85 MMHG | TEMPERATURE: 98 F | HEIGHT: 72 IN | RESPIRATION RATE: 14 BRPM | HEART RATE: 83 BPM | BODY MASS INDEX: 41.15 KG/M2

## 2024-05-23 DIAGNOSIS — H66.41 SUPPURATIVE OTITIS MEDIA OF RIGHT EAR, UNSPECIFIED CHRONICITY: ICD-10-CM

## 2024-05-23 DIAGNOSIS — H92.11 PURULENT DRAINAGE FROM RIGHT EAR THROUGH EAR TUBE: Primary | ICD-10-CM

## 2024-05-23 DIAGNOSIS — Z96.22 PATENT PRESSURE EQUALIZATION (PE) TUBE: ICD-10-CM

## 2024-05-23 PROCEDURE — 99213 OFFICE O/P EST LOW 20 MIN: CPT | Performed by: INTERNAL MEDICINE

## 2024-05-23 RX ORDER — AMOXICILLIN 875 MG
875 TABLET ORAL 2 TIMES DAILY
Qty: 10 TABLET | Refills: 0 | Status: SHIPPED | OUTPATIENT
Start: 2024-05-23 | End: 2024-05-28

## 2024-05-23 RX ORDER — OFLOXACIN 3 MG/ML
10 SOLUTION AURICULAR (OTIC) 2 TIMES DAILY
Qty: 14 ML | Refills: 0 | Status: SHIPPED | OUTPATIENT
Start: 2024-05-23

## 2024-05-23 ASSESSMENT — ENCOUNTER SYMPTOMS
VOMITING: 0
SORE THROAT: 0
HEADACHES: 0
DIARRHEA: 0
RHINORRHEA: 0
NECK PAIN: 0
COUGH: 0
ABDOMINAL PAIN: 0

## 2024-05-23 ASSESSMENT — PAIN SCALES - GENERAL: PAINLEVEL: MILD PAIN (2)

## 2024-05-23 NOTE — NURSING NOTE
Chief Complaint   Patient presents with    Ear Problem     Right ear plugged and loss of hearing, no drainage for a couple weeks, history of t-tube in right ear     Patient states that he had a few drops from last ear infection and he says he used it and that is when he noticed the most drainage from his ear that was yellow in color. Patient states that he also used Flonase.    Initial /85 (BP Location: Right arm, Patient Position: Sitting, Cuff Size: Adult Large)   Pulse 83   Temp 98  F (36.7  C) (Temporal)   Resp 14   Ht 1.829 m (6')   Wt 137.8 kg (303 lb 12.8 oz)   SpO2 96%   BMI 41.20 kg/m   Estimated body mass index is 41.2 kg/m  as calculated from the following:    Height as of this encounter: 1.829 m (6').    Weight as of this encounter: 137.8 kg (303 lb 12.8 oz).  Medication Review: complete    The next two questions are to help us understand your food security.  If you are feeling you need any assistance in this area, we have resources available to support you today.          10/5/2023   SDOH- Food Insecurity   Within the past 12 months, did you worry that your food would run out before you got money to buy more? N   Within the past 12 months, did the food you bought just not last and you didn t have money to get more? OG Soler

## 2024-05-23 NOTE — PROGRESS NOTES
Assessment & Plan     ICD-10-CM    1. Purulent drainage from right ear through ear tube  H92.11 ofloxacin (FLOXIN) 0.3 % otic solution     amoxicillin (AMOXIL) 875 MG tablet      2. Patent pressure equalization (PE) tube  Z96.22 ofloxacin (FLOXIN) 0.3 % otic solution      3. Suppurative otitis media of right ear, unspecified chronicity  H66.41 ofloxacin (FLOXIN) 0.3 % otic solution     amoxicillin (AMOXIL) 875 MG tablet         Patient presents for ear related issues.  Has been having problems with his right ear again for the last 2 and half to 3 months.  Back in 2017 ruptured his TM on his honeymoon.  Found some old antibiotic eardrops and started using them in his right ear a couple days ago.  Has been having problems with his ear on and off since 2017.    About 2 years ago had eustachian tube expansion, balloon dilatation of his eustachian tube by ENT.  Has been doing well up until just below the last 2-1/2 to 3 months.    He has appointment for follow-up with ENT again in July.  Has been having thick yellow purulent drainage from his right ear.  Still has pressure equalizing tube in place.    Given the amount of abnormality and still having purulent drainage behind his right TM, start oral amoxicillin as well as ofloxacin otic solution drops.           BMI  Estimated body mass index is 41.2 kg/m  as calculated from the following:    Height as of this encounter: 1.829 m (6').    Weight as of this encounter: 137.8 kg (303 lb 12.8 oz).           Return for follow-up as needed for new or worsening symptoms, Appointments: [phone].      Alden Giang MD  [facility] AND [facility]    Review of Systems   HENT:  Positive for ear pain and hearing loss. Negative for ear discharge, rhinorrhea and sore throat.    Respiratory:  Negative for cough.    Gastrointestinal:  Negative for abdominal pain, diarrhea and vomiting.   Musculoskeletal:  Negative for neck pain.   Skin:  Negative for rash.    Allergic/Immunologic: Negative for immunocompromised state.   Neurological:  Negative for headaches.       Charly Heaton is a 33 year old, presenting for the following health issues:  Ear Problem (Right ear plugged and loss of hearing, no drainage for a couple weeks, history of t-tube in right ear)        5/23/2024    10:21 AM   Additional Questions   Roomed by CHAIM Mishra     History of Present Illness       Reason for visit:  Plugged ear    He eats 2-3 servings of fruits and vegetables daily.He consumes 1 sweetened beverage(s) daily.He exercises with enough effort to increase his heart rate 20 to 29 minutes per day.  He exercises with enough effort to increase his heart rate 3 or less days per week.   He is taking medications regularly.                     Objective    /85 (BP Location: Right arm, Patient Position: Sitting, Cuff Size: Adult Large)   Pulse 83   Temp 98  F (36.7  C) (Temporal)   Resp 14   Ht 1.829 m (6')   Wt 137.8 kg (303 lb 12.8 oz)   SpO2 96%   BMI 41.20 kg/m    Body mass index is 41.2 kg/m .  Physical Exam  Constitutional:       Appearance: He is obese.   HENT:      Right Ear: Ear canal and external ear normal.      Left Ear: Tympanic membrane, ear canal and external ear normal. There is no impacted cerumen.      Ears:      Comments: PE tube in place in right ear.   Purulent drainage noted behind right TM.     Mouth/Throat:      Mouth: Mucous membranes are moist.      Pharynx: Oropharynx is clear. No posterior oropharyngeal erythema.   Eyes:      General: No scleral icterus.     Conjunctiva/sclera: Conjunctivae normal.   Pulmonary:      Effort: Pulmonary effort is normal.   Neurological:      Mental Status: He is alert.                    Signed Electronically by: Alden Giang MD

## 2024-05-23 NOTE — PATIENT INSTRUCTIONS
Purulent drainage from right ear through ear tube    START:   - ofloxacin (FLOXIN) 0.3 % otic solution; Place 10 drops into the right ear 2 times daily    START:   - amoxicillin (AMOXIL) 875 MG tablet; Take 1 tablet (875 mg) by mouth 2 times daily for 5 days        Return as needed for follow-up for new / worsening symptoms.    Clinic : [phone]  Appointment line: [phone]

## 2024-07-02 ENCOUNTER — OFFICE VISIT (OUTPATIENT)
Dept: OTOLARYNGOLOGY | Facility: OTHER | Age: 34
End: 2024-07-02
Attending: OTOLARYNGOLOGY
Payer: COMMERCIAL

## 2024-07-02 DIAGNOSIS — H92.11 OTORRHEA, RIGHT: Primary | ICD-10-CM

## 2024-07-02 PROCEDURE — G0463 HOSPITAL OUTPT CLINIC VISIT: HCPCS

## 2024-07-13 ENCOUNTER — HEALTH MAINTENANCE LETTER (OUTPATIENT)
Age: 34
End: 2024-07-13

## 2024-07-16 NOTE — PROGRESS NOTES
document embedded image                                   [facility]                                                                                                                                         Patient Name: Sudarshan Skelton   Address: [address] [place] [address]    YOB: 1990   [place] [id number]   MR Number: [id number]   Phone: [phone]  PCP: UNKNOWN           Appointment Date: 07/02/24  Visit Provider: Franko Richards MD    cc: ~    ENT Progress Note        Intake  Visit Reasons: plugged ears    HPI  History of Present Illness  Chief complaint:  Otorrhea right ear    History  The patient is a 34-year-old male who approximately 2-1/2 years ago underwent Eustachian tube balloon dilation and T-tube placement on the right.  He would done well until a proximally 3 months ago when he started developing some drainage and decreased hearing from the right ear.  He is failed antibiotic drops.    Exam   The left external auditory canal and TM are healthy.  On the right, a T-tube is in place inferiorly.  There is grossly purulent drainage present.  After discussion with the patient he elected to proceed with tube removal today.  This was accomplished using an alligator forceps being a small perf in the eardrum at the site of the tube.    Allergies    grass pollen Adverse Reaction (Unverified 11/02/22 10:25)  Unknown    PFSH  PFSH:   Past Medical History: (Reviewed 11/02/22 @ 10:25 by Anaya Robles Med Assist)    Asthma  Diagnosis unknown  no eCW History  Ear pressure  Hearing loss    Past Surgical History: (Reviewed 11/02/22 @ 10:25 by Anaya Robles Med Assist)    History of ear surgery  History of placement of ear tubes  History of repair of ACL  Hx of appendectomy      Social History: (Reviewed 11/02/22 @ 10:25 by Anaya Robles Med Assist)  Smoking Status:  Never smoker   second hand exposure:  No   alcohol intake:  never   substance use type:  does not use     A&P  Assessment &  Plan  (1) Otorrhea, right ear:         Status: Acute        Code(s):  H92.11 - Otorrhea, right ear  We will get him another course of drops and have him follow up in a month to make sure the drainage has stopped in the perforation is sealed.                  Medications:   New  ofloxacin 0.3%   3 drps  OTIC Q12H 10 days 10 mL 0RF                       Franko Richards MD    Filed: 07/03/24 1118      <Electronically signed by Franko Richards MD> 07/07/24 1273

## 2024-08-22 ENCOUNTER — OFFICE VISIT (OUTPATIENT)
Dept: FAMILY MEDICINE | Facility: OTHER | Age: 34
End: 2024-08-22
Attending: STUDENT IN AN ORGANIZED HEALTH CARE EDUCATION/TRAINING PROGRAM
Payer: COMMERCIAL

## 2024-08-22 VITALS
RESPIRATION RATE: 16 BRPM | OXYGEN SATURATION: 98 % | HEART RATE: 68 BPM | HEIGHT: 73 IN | TEMPERATURE: 97.5 F | BODY MASS INDEX: 39.18 KG/M2 | WEIGHT: 295.6 LBS | DIASTOLIC BLOOD PRESSURE: 84 MMHG | SYSTOLIC BLOOD PRESSURE: 130 MMHG

## 2024-08-22 DIAGNOSIS — Z13.1 ENCOUNTER FOR SCREENING EXAMINATION FOR IMPAIRED GLUCOSE REGULATION AND DIABETES MELLITUS: ICD-10-CM

## 2024-08-22 DIAGNOSIS — L98.9 SKIN LESION OF FACE: ICD-10-CM

## 2024-08-22 DIAGNOSIS — Z00.00 ANNUAL PHYSICAL EXAM: Primary | ICD-10-CM

## 2024-08-22 DIAGNOSIS — Z01.30 BP CHECK: ICD-10-CM

## 2024-08-22 DIAGNOSIS — Z13.220 ENCOUNTER FOR LIPID SCREENING FOR CARDIOVASCULAR DISEASE: ICD-10-CM

## 2024-08-22 DIAGNOSIS — R73.9 HYPERGLYCEMIA: ICD-10-CM

## 2024-08-22 DIAGNOSIS — E66.01 CLASS 2 SEVERE OBESITY WITH SERIOUS COMORBIDITY AND BODY MASS INDEX (BMI) OF 38.0 TO 38.9 IN ADULT, UNSPECIFIED OBESITY TYPE (H): ICD-10-CM

## 2024-08-22 DIAGNOSIS — Z13.6 ENCOUNTER FOR LIPID SCREENING FOR CARDIOVASCULAR DISEASE: ICD-10-CM

## 2024-08-22 DIAGNOSIS — L91.8 SKIN TAG: ICD-10-CM

## 2024-08-22 DIAGNOSIS — E66.812 CLASS 2 SEVERE OBESITY WITH SERIOUS COMORBIDITY AND BODY MASS INDEX (BMI) OF 38.0 TO 38.9 IN ADULT, UNSPECIFIED OBESITY TYPE (H): ICD-10-CM

## 2024-08-22 LAB
ALBUMIN SERPL BCG-MCNC: 4.8 G/DL (ref 3.5–5.2)
ALP SERPL-CCNC: 106 U/L (ref 40–150)
ALT SERPL W P-5'-P-CCNC: 43 U/L (ref 0–70)
ANION GAP SERPL CALCULATED.3IONS-SCNC: 9 MMOL/L (ref 7–15)
AST SERPL W P-5'-P-CCNC: 28 U/L (ref 0–45)
BILIRUB SERPL-MCNC: 0.5 MG/DL
BUN SERPL-MCNC: 11.5 MG/DL (ref 6–20)
CALCIUM SERPL-MCNC: 9.6 MG/DL (ref 8.8–10.4)
CHLORIDE SERPL-SCNC: 105 MMOL/L (ref 98–107)
CHOLEST SERPL-MCNC: 148 MG/DL
CREAT SERPL-MCNC: 0.97 MG/DL (ref 0.67–1.17)
EGFRCR SERPLBLD CKD-EPI 2021: >90 ML/MIN/1.73M2
FASTING STATUS PATIENT QL REPORTED: NO
FASTING STATUS PATIENT QL REPORTED: NO
GLUCOSE SERPL-MCNC: 101 MG/DL (ref 70–99)
HCO3 SERPL-SCNC: 26 MMOL/L (ref 22–29)
HDLC SERPL-MCNC: 51 MG/DL
LDLC SERPL CALC-MCNC: 76 MG/DL
NONHDLC SERPL-MCNC: 97 MG/DL
POTASSIUM SERPL-SCNC: 4.3 MMOL/L (ref 3.4–5.3)
PROT SERPL-MCNC: 7.6 G/DL (ref 6.4–8.3)
SODIUM SERPL-SCNC: 140 MMOL/L (ref 135–145)
TRIGL SERPL-MCNC: 107 MG/DL

## 2024-08-22 PROCEDURE — 82465 ASSAY BLD/SERUM CHOLESTEROL: CPT | Mod: ZL | Performed by: STUDENT IN AN ORGANIZED HEALTH CARE EDUCATION/TRAINING PROGRAM

## 2024-08-22 PROCEDURE — 82247 BILIRUBIN TOTAL: CPT | Mod: ZL | Performed by: STUDENT IN AN ORGANIZED HEALTH CARE EDUCATION/TRAINING PROGRAM

## 2024-08-22 PROCEDURE — 99395 PREV VISIT EST AGE 18-39: CPT | Performed by: STUDENT IN AN ORGANIZED HEALTH CARE EDUCATION/TRAINING PROGRAM

## 2024-08-22 PROCEDURE — 36415 COLL VENOUS BLD VENIPUNCTURE: CPT | Mod: ZL | Performed by: STUDENT IN AN ORGANIZED HEALTH CARE EDUCATION/TRAINING PROGRAM

## 2024-08-22 SDOH — HEALTH STABILITY: PHYSICAL HEALTH: ON AVERAGE, HOW MANY DAYS PER WEEK DO YOU ENGAGE IN MODERATE TO STRENUOUS EXERCISE (LIKE A BRISK WALK)?: 3 DAYS

## 2024-08-22 ASSESSMENT — ENCOUNTER SYMPTOMS
SORE THROAT: 0
FREQUENCY: 0
SHORTNESS OF BREATH: 0
FATIGUE: 1
NUMBNESS: 0
DIARRHEA: 0
FEVER: 0
SINUS PAIN: 0
MYALGIAS: 0
DYSPHORIC MOOD: 0
DYSURIA: 0
ACTIVITY CHANGE: 1
BLOOD IN STOOL: 0
ARTHRALGIAS: 0
TROUBLE SWALLOWING: 0
BRUISES/BLEEDS EASILY: 0
LIGHT-HEADEDNESS: 0
NAUSEA: 0
WHEEZING: 0
RHINORRHEA: 0
SINUS PRESSURE: 0
COUGH: 1
BACK PAIN: 0
DIFFICULTY URINATING: 0
VOMITING: 0
SLEEP DISTURBANCE: 0
NECK PAIN: 0
ABDOMINAL PAIN: 0
WEAKNESS: 0
PALPITATIONS: 0
CHILLS: 0
CONSTIPATION: 0
NERVOUS/ANXIOUS: 0

## 2024-08-22 ASSESSMENT — SOCIAL DETERMINANTS OF HEALTH (SDOH): HOW OFTEN DO YOU GET TOGETHER WITH FRIENDS OR RELATIVES?: ONCE A WEEK

## 2024-08-22 ASSESSMENT — PAIN SCALES - GENERAL: PAINLEVEL: NO PAIN (0)

## 2024-08-22 NOTE — PROGRESS NOTES
"Preventive Care Visit  [facility], [facility]  Aug 22, 2024      Assessment & Plan     (Z00.00) Annual physical exam  (primary encounter diagnosis)  Comment: Discussed preventive medicine  Plan: Lipid Panel, Comprehensive metabolic panel    (Z13.220,  Z13.6) Encounter for lipid screening for cardiovascular disease  Comment: check lipids  Plan: Lipid Panel, Comprehensive metabolic panel    (Z13.1) Encounter for screening examination for impaired glucose regulation and diabetes mellitus  Comment: check glucose  Plan: Lipid Panel, Comprehensive metabolic panel    (Z01.30) BP check  Comment: 130/84; monitor    (E66.01,  Z68.38) Class 2 severe obesity with serious comorbidity and body mass index (BMI) of 38.0 to 38.9 in adult, unspecified obesity type (H)  Comment: gained 70 lbs since 2014 but lost 10 lbs in last month with portion control and increased activity  Plan: Lipid Panel, Comprehensive metabolic panel    (L98.9) Skin lesion of face  Comment: Likely AK, defer cryotherapy versus biopsy versus dermatology referral.    (L91.8) Skin tag  Comment: Multiple skin tags around shirt collar. defer cryotherapy    (R73.9) Hyperglycemia  Comment: CMP came back with slightly elevated glucose.  Plan: Hemoglobin A1c      BMI  Estimated body mass index is 38.73 kg/m  as calculated from the following:    Height as of this encounter: 1.861 m (6' 1.25\").    Weight as of this encounter: 134.1 kg (295 lb 9.6 oz).   Weight management plan: Discussed healthy diet and exercise guidelines    Counseling  Appropriate preventive services were addressed with this patient via screening, questionnaire, or discussion as appropriate for fall prevention, nutrition, physical activity, Tobacco-use cessation, social engagement, weight loss and cognition.  Checklist reviewing preventive services available has been given to the patient.  Reviewed patient's diet, addressing concerns and/or questions.   He " is at risk for lack of exercise and has been provided with information to increase physical activity for the benefit of his well-being.   He is at risk for psychosocial distress and has been provided with information to reduce risk.         Return in about 11 months (around 7/14/2025) for Routine preventive. While still on summer break    Charly Heaton is a 34 year old, presenting for the following:  Physical        8/22/2024    12:44 PM   Additional Questions   Roomed by ANITA Mcdonald   Accompanied by Self         8/22/2024    12:44 PM   Patient Reported Additional Medications   Patient reports taking the following new medications N/A        Health Care Directive  Patient does not have a Health Care Directive or Living Will: Discussed advance care planning with patient; however, patient declined at this time. Wife knows his wishes.    HPI  34-year-old male with recurrent middle ear infections, eustachian tube dysfunction, and allergies who presents for annual physical.  No complaints at this time.  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 Trying to lose weight-over the last month he is cut back sugary drinks, increased his vegetables, decreased his portion sizes for each meal, start exercising with free weights and elliptical.  Has lost 10 pounds in the last month.  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  Concern about spot on his forehead.          8/22/2024   General Health   How would you rate your overall physical health? Good   Feel stress (tense, anxious, or unable to sleep) Only a little      (!) STRESS CONCERN      8/22/2024   Nutrition   Three or more servings of calcium each day? Yes   Diet: Regular (no restrictions)   How many servings of fruit and  vegetables per day? (!) 2-3   How many sweetened beverages each day? 0-1            8/22/2024   Exercise   Days per week of moderate/strenous exercise 3 days - 20-30min            8/22/2024   Social Factors   Frequency of gathering with friends or relatives Once a week   Worry food won't last until get money to buy more No   Food not last or not have enough money for food? No   Do you have housing? (Housing is defined as stable permanent housing and does not include staying ouside in a car, in a tent, in an abandoned building, in an overnight shelter, or couch-surfing.) Yes   Are you worried about losing your housing? No   Lack of transportation? No   Unable to get utilities (heat,electricity)? No            8/22/2024   Dental   Dentist two times every year? Yes            8/22/2024   TB Screening   Were you born outside of the [place]? No              Today's PHQ-2 Score:       5/23/2024    10:14 AM   PHQ-2 ( 1999 [employer])   Q1: Little interest or pleasure in doing things 0   Q2: Feeling down, depressed or hopeless 0   PHQ-2 Score 0   Q1: Little interest or pleasure in doing things Not at all   Q2: Feeling down, depressed or hopeless Not at all   PHQ-2 Score 0         8/22/2024   Substance Use   Alcohol more than 3/day or more than 7/wk No   Do you use any other substances recreationally? No        Social History     Tobacco Use    Smoking status: Never    Smokeless tobacco: Never   Vaping Use    Vaping status: Never Used   Substance Use Topics    Alcohol use: Not Currently    Drug use: Never           8/22/2024   STI Screening   New sexual partner(s) since last STI/HIV test? No            8/22/2024   Contraception/Family Planning   Questions about contraception or family planning No           Reviewed and updated as needed this visit by Provider                    Past Medical History:   Diagnosis Date    Exercise-induced asthma 2001     Past Surgical History:   Procedure Laterality Date    APPENDECTOMY  1999    IN  BALLOON DILATION OF EUSTACHIAN TUBE Bilateral 2022    TYMPANOPLASTY CHILD Bilateral     TYMPANOPLASTY, RT/LT Right 2022    ZZC REPAIR CRUCIATE LIGAMENT,KNEE  2002    ACL     Labs reviewed in EPIC  BP Readings from Last 3 Encounters:   08/22/24 130/84   05/23/24 130/85   02/28/24 (!) 144/84    Wt Readings from Last 3 Encounters:   08/22/24 134.1 kg (295 lb 9.6 oz)   05/23/24 137.8 kg (303 lb 12.8 oz)   02/28/24 135.2 kg (298 lb)                  Patient Active Problem List   Diagnosis    Chronic ERIS (middle ear effusion), right    Purulent drainage from right ear through ear tube    Patent pressure equalization (PE) tube     Past Surgical History:   Procedure Laterality Date    APPENDECTOMY  1999    [place] BALLOON DILATION OF EUSTACHIAN TUBE Bilateral 2022    TYMPANOPLASTY CHILD Bilateral     TYMPANOPLASTY, RT/LT Right 2022    ZZC REPAIR CRUCIATE LIGAMENT,KNEE  2002    ACL       Social History     Tobacco Use    Smoking status: Never    Smokeless tobacco: Never   Substance Use Topics    Alcohol use: Not Currently     Family History   Problem Relation Age of Onset    No Known Problems Mother     Skin Cancer Father         ear    Coronary Artery Disease Maternal Grandmother 75    Coronary Artery Disease Maternal Uncle 54        MI         Current Outpatient Medications   Medication Sig Dispense Refill    ofloxacin (FLOXIN) 0.3 % otic solution Place 10 drops into the right ear 2 times daily 14 mL 0     No Known Allergies    Review of Systems   Constitutional:  Positive for activity change (started working out) and fatigue (energy lower). Negative for chills and fever.   HENT:  Negative for congestion, dental problem, ear discharge, ear pain, mouth sores, nosebleeds, postnasal drip, rhinorrhea, sinus pressure, sinus pain, sore throat and trouble swallowing.    Eyes:  Negative for visual disturbance.   Respiratory:  Positive for cough (chronic, worse in winter). Negative for shortness of breath and wheezing.   "  Cardiovascular:  Negative for chest pain, palpitations and leg swelling.   Gastrointestinal:  Negative for abdominal pain, blood in stool, constipation, diarrhea, nausea and vomiting.   Endocrine: Negative for cold intolerance and heat intolerance.   Genitourinary:  Negative for difficulty urinating, dysuria, frequency and urgency.   Musculoskeletal:  Negative for arthralgias, back pain, myalgias and neck pain.   Skin:         Skin lesion forehead   Allergic/Immunologic: Positive for environmental allergies.   Neurological:  Negative for weakness, light-headedness and numbness.   Hematological:  Does not bruise/bleed easily.   Psychiatric/Behavioral:  Negative for dysphoric mood and sleep disturbance. The patient is not nervous/anxious.            Objective    Exam  /84   Pulse 68   Temp 97.5  F (36.4  C) (Tympanic)   Resp 16   Ht 1.861 m (6' 1.25\")   Wt 134.1 kg (295 lb 9.6 oz)   SpO2 98%   BMI 38.73 kg/m     Estimated body mass index is 38.73 kg/m  as calculated from the following:    Height as of this encounter: 1.861 m (6' 1.25\").    Weight as of this encounter: 134.1 kg (295 lb 9.6 oz).    Physical Exam  Constitutional:       General: He is not in acute distress.     Appearance: He is obese.   HENT:      Head: Normocephalic.      Right Ear: Tympanic membrane is scarred. Tympanic membrane is not erythematous.      Left Ear: Tympanic membrane is retracted. Tympanic membrane is not erythematous.      Mouth/Throat:      Mouth: Mucous membranes are moist.   Eyes:      Extraocular Movements: Extraocular movements intact.      Conjunctiva/sclera: Conjunctivae normal.   Neck:      Vascular: No carotid bruit.   Cardiovascular:      Rate and Rhythm: Normal rate and regular rhythm.      Heart sounds: No murmur heard.     No friction rub. No gallop.   Pulmonary:      Breath sounds: No wheezing, rhonchi or rales.   Abdominal:      General: Bowel sounds are normal.      Tenderness: There is no abdominal " tenderness.   Musculoskeletal:         General: No deformity. Normal range of motion.      Right lower leg: No edema.      Left lower leg: No edema.   Skin:     General: Skin is warm.      Findings: Lesion present.   Neurological:      General: No focal deficit present.      Mental Status: He is alert.      Gait: Gait normal.   Psychiatric:         Mood and Affect: Mood normal.           I spent a total of 77 minutes on the day of the visit.  Total time spent by me doing chart review, history and exam, documentation and further activities per the note on day of encounter.      Signed Electronically by: Antolin Garvin DO

## 2024-08-22 NOTE — NURSING NOTE
"Chief Complaint   Patient presents with    Physical       Initial /84   Pulse 68   Temp 97.5  F (36.4  C) (Tympanic)   Resp 16   Ht 1.861 m (6' 1.25\")   Wt 134.1 kg (295 lb 9.6 oz)   SpO2 98%   BMI 38.73 kg/m   Estimated body mass index is 38.73 kg/m  as calculated from the following:    Height as of this encounter: 1.861 m (6' 1.25\").    Weight as of this encounter: 134.1 kg (295 lb 9.6 oz).  Medication Reconciliation: complete          "

## 2024-08-27 ENCOUNTER — LAB (OUTPATIENT)
Dept: LAB | Facility: OTHER | Age: 34
End: 2024-08-27
Attending: STUDENT IN AN ORGANIZED HEALTH CARE EDUCATION/TRAINING PROGRAM
Payer: COMMERCIAL

## 2024-08-27 ENCOUNTER — OFFICE VISIT (OUTPATIENT)
Dept: OTOLARYNGOLOGY | Facility: OTHER | Age: 34
End: 2024-08-27
Attending: OTOLARYNGOLOGY
Payer: COMMERCIAL

## 2024-08-27 DIAGNOSIS — H90.A31 MIXED CONDUCTIVE AND SENSORINEURAL HEARING LOSS, UNILATERAL, RIGHT EAR WITH RESTRICTED HEARING ON THE CONTRALATERAL SIDE: Primary | ICD-10-CM

## 2024-08-27 DIAGNOSIS — R73.9 HYPERGLYCEMIA: ICD-10-CM

## 2024-08-27 DIAGNOSIS — H69.83 OTHER SPECIFIED DISORDERS OF EUSTACHIAN TUBE, BILATERAL: ICD-10-CM

## 2024-08-27 LAB — HBA1C MFR BLD: 5.7 % (ref 4–6.2)

## 2024-08-27 PROCEDURE — 36415 COLL VENOUS BLD VENIPUNCTURE: CPT | Mod: ZL

## 2024-08-27 PROCEDURE — 83036 HEMOGLOBIN GLYCOSYLATED A1C: CPT | Mod: ZL

## 2024-08-27 PROCEDURE — G0463 HOSPITAL OUTPT CLINIC VISIT: HCPCS

## 2024-09-03 NOTE — PROGRESS NOTES
document embedded image                                   [facility]                                                                                                                                         Patient Name: Sudarshan Skelton   Address: [address] [place] [address]    YOB: 1990   [place] [id number]   MR Number: [id number]   Phone: [phone]  PCP: UNKNOWN           Appointment Date: 08/27/24  Visit Provider: Franko Richards MD    cc: ~    ENT Progress Note        Intake  Visit Reasons: Ear Follow up    HPI  History of Present Illness  Chief complaint:  Follow up tube removal     History  The patient is a 34-year-old male who has had Eustachian tube balloon dilation in his right ear couple of years ago.  He had a T-tube placed as well.  He had developed drainage that had persisted from the right ear and a month ago underwent T-tube removal.  He is here today for follow up.  He has had no lingering drainage.  He does feel his hearing is down slightly in the right ear.      Exam   The external auditory canals are clear bilaterally.  His left tympanic membrane appears healthy.  On the right, the eardrum is intact.  There is some posterior tympanosclerosis.  Tuning fork responses are normal   The remainder of the head neck exam is unremarkable  Audiogram-he was negative pressure peak on tympanometry on the right and a very slight conductive hearing loss in the right.  His speech reception threshold is 20 decibels on the right compared to 15 decibels on the left with 100% discrimination score on the right compared to 96% on the left.    Allergies    grass pollen Adverse Reaction (Unverified 11/02/22 10:25)  Unknown    PFSH  PFSH:   Past Medical History: (Reviewed 11/02/22 @ 10:25 by Anaya Robles, Med Assist)    Asthma  Diagnosis unknown  no eCW History  Ear pressure  Hearing loss    Past Surgical History: (Reviewed 11/02/22 @ 10:25 by Anaya Robles, Med Assist)    History of ear  surgery  History of placement of ear tubes  History of repair of ACL  Hx of appendectomy      Social History: (Reviewed 11/02/22 @ 10:25 by Anaya Robles, Med Assist)  Smoking Status:  Never smoker   second hand exposure:  No   alcohol intake:  never   substance use type:  does not use     A&P  Assessment & Plan  (1) Mixed conductive and sensorineural hearing loss of right ear with restricted hearing of left ear:         Status: Acute        Code(s):  H90.A31 - Mixed conductive and sensorineural hearing loss, unilateral, right ear with restricted hearing on the contralateral side            (2) Eustachian tube dysfunction:         Status: Acute        Code(s):  H69.80 - Other specified disorders of Eustachian tube, unspecified ear        Qualifiers:          Laterality: bilateral  Qualified Code(s): H69.83 - Other specified disorders of Eustachian tube, bilateral              Plan  I would not recommend any intervention regarding his ears at this time.  I would advise follow up in a year, sooner if he has worsening symptoms.                Franko Richards MD    Filed: 08/28/24 0953      <Electronically signed by Franko Richards MD> 08/28/24 5029

## 2025-02-18 ENCOUNTER — OFFICE VISIT (OUTPATIENT)
Dept: FAMILY MEDICINE | Facility: OTHER | Age: 35
End: 2025-02-18
Attending: FAMILY MEDICINE
Payer: COMMERCIAL

## 2025-02-18 VITALS
WEIGHT: 298 LBS | TEMPERATURE: 97.9 F | DIASTOLIC BLOOD PRESSURE: 86 MMHG | BODY MASS INDEX: 39.49 KG/M2 | HEART RATE: 94 BPM | RESPIRATION RATE: 19 BRPM | SYSTOLIC BLOOD PRESSURE: 130 MMHG | OXYGEN SATURATION: 96 % | HEIGHT: 73 IN

## 2025-02-18 DIAGNOSIS — M54.50 ACUTE RIGHT-SIDED LOW BACK PAIN WITHOUT SCIATICA: Primary | ICD-10-CM

## 2025-02-18 RX ORDER — CYCLOBENZAPRINE HCL 10 MG
10 TABLET ORAL 3 TIMES DAILY PRN
Qty: 30 TABLET | Refills: 0 | Status: SHIPPED | OUTPATIENT
Start: 2025-02-18

## 2025-02-18 RX ORDER — MELOXICAM 15 MG/1
15 TABLET ORAL DAILY
Qty: 15 TABLET | Refills: 0 | Status: SHIPPED | OUTPATIENT
Start: 2025-02-18

## 2025-02-18 ASSESSMENT — PAIN SCALES - GENERAL: PAINLEVEL_OUTOF10: MODERATE PAIN (4)

## 2025-02-18 ASSESSMENT — ENCOUNTER SYMPTOMS: BACK PAIN: 1

## 2025-02-18 NOTE — PROGRESS NOTES
"Patient is here to be seen for back pain and a spot on his forehead.  Assessment & Plan     Acute right-sided low back pain without sciatica    - cyclobenzaprine (FLEXERIL) 10 MG tablet; Take 1 tablet (10 mg) by mouth 3 times daily as needed for muscle spasms.  - meloxicam (MOBIC) 15 MG tablet; Take 1 tablet (15 mg) by mouth daily.    Discussed also physical therapy.  Patient will try medications initially.      BMI  Estimated body mass index is 39.05 kg/m  as calculated from the following:    Height as of this encounter: 1.861 m (6' 1.25\").    Weight as of this encounter: 135.2 kg (298 lb).             No follow-ups on file.    Subjective   Sudarshan is a 34 year old, presenting for the following health issues:  Back Pain (Lower x1 week) and Derm Problem (Spot on forehead that appeared x1 month/no pain)    Back Pain     History of Present Illness       Back Pain:  He presents for follow up of back pain. Patient's back pain is a new problem.    Original cause of back pain: not sure  First noticed back pain: in the last week  Patient feels back pain: constantlyLocation of back pain:  Right lower back  Description of back pain: sharp  Back pain spreads: nowhere    Since patient first noticed back pain, pain is: always present, but gets better and worse  Does back pain interfere with his job:  No  On a scale of 1-10 (10 being the worst), patient describes pain as:  5  What makes back pain worse: bending   Acupuncture: not tried  Acetaminophen: helpful  Activity or exercise: not tried  Chiropractor:  Not tried  Cold: not tried  Heat: not tried  Massage: not tried  Muscle relaxants: not tried  NSAIDS: helpful  Opioids: not tried  Physical Therapy: not tried  Rest: not helpful  Steroid Injection: not tried  Stretching: not helpful  Surgery: not tried  TENS unit: not tried  Topical pain relievers: not tried  Other healthcare providers patient is seeing for back pain: None   He is taking medications regularly.       Patient " "denies any saddle anesthesia.  No changes in bowel or bladder              Objective    /86 (BP Location: Right arm, Patient Position: Sitting, Cuff Size: Adult Regular)   Pulse 94   Temp 97.9  F (36.6  C) (Tympanic)   Resp 19   Ht 1.861 m (6' 1.25\")   Wt 135.2 kg (298 lb)   SpO2 96%   BMI 39.05 kg/m    Body mass index is 39.05 kg/m .  Physical Exam   GENERAL: alert and no distress  ABDOMEN: soft, nontender, no hepatosplenomegaly, no masses and bowel sounds normal  MS: no gross musculoskeletal defects noted, no edema  Patient can stand on his toes and heels without difficulty.  Squats without difficulty.          Signed Electronically by: Timbo Gonsalez MD    "

## 2025-05-01 ENCOUNTER — HOSPITAL ENCOUNTER (EMERGENCY)
Facility: HOSPITAL | Age: 35
Discharge: HOME | End: 2025-05-01
Attending: STUDENT IN AN ORGANIZED HEALTH CARE EDUCATION/TRAINING PROGRAM
Payer: COMMERCIAL

## 2025-05-01 VITALS
HEART RATE: 68 BPM | DIASTOLIC BLOOD PRESSURE: 90 MMHG | SYSTOLIC BLOOD PRESSURE: 132 MMHG | OXYGEN SATURATION: 93 % | RESPIRATION RATE: 16 BRPM | TEMPERATURE: 96.5 F

## 2025-05-01 DIAGNOSIS — N20.1 URETEROLITHIASIS: ICD-10-CM

## 2025-05-01 LAB
ALBUMIN UR-MCNC: 20 MG/DL
APPEARANCE UR: CLEAR
BILIRUB UR QL STRIP: NEGATIVE
COLOR UR AUTO: YELLOW
GLUCOSE UR STRIP-MCNC: NEGATIVE MG/DL
HGB UR QL STRIP: ABNORMAL
KETONES UR STRIP-MCNC: NEGATIVE MG/DL
LEUKOCYTE ESTERASE UR QL STRIP: NEGATIVE
MUCOUS THREADS #/AREA URNS LPF: PRESENT /LPF
NITRATE UR QL: NEGATIVE
PH UR STRIP: 5.5 [PH] (ref 4.7–8)
RBC URINE: 13 /HPF
SP GR UR STRIP: 1.03 (ref 1–1.03)
SQUAMOUS EPITHELIAL: 0 /HPF
UROBILINOGEN UR STRIP-MCNC: NORMAL MG/DL
WBC URINE: 4 /HPF

## 2025-05-01 PROCEDURE — 99284 EMERGENCY DEPT VISIT MOD MDM: CPT | Performed by: STUDENT IN AN ORGANIZED HEALTH CARE EDUCATION/TRAINING PROGRAM

## 2025-05-01 PROCEDURE — 81001 URINALYSIS AUTO W/SCOPE: CPT | Performed by: STUDENT IN AN ORGANIZED HEALTH CARE EDUCATION/TRAINING PROGRAM

## 2025-05-01 PROCEDURE — 99284 EMERGENCY DEPT VISIT MOD MDM: CPT | Mod: 25

## 2025-05-01 PROCEDURE — 82365 CALCULUS SPECTROSCOPY: CPT | Mod: 90 | Performed by: FAMILY MEDICINE

## 2025-05-01 RX ORDER — ONDANSETRON 4 MG/1
4 TABLET, ORALLY DISINTEGRATING ORAL EVERY 8 HOURS PRN
Qty: 15 TABLET | Refills: 0 | Status: SHIPPED | OUTPATIENT
Start: 2025-05-01

## 2025-05-01 ASSESSMENT — COLUMBIA-SUICIDE SEVERITY RATING SCALE - C-SSRS
6. HAVE YOU EVER DONE ANYTHING, STARTED TO DO ANYTHING, OR PREPARED TO DO ANYTHING TO END YOUR LIFE?: NO
2. HAVE YOU ACTUALLY HAD ANY THOUGHTS OF KILLING YOURSELF IN THE PAST MONTH?: NO
1. IN THE PAST MONTH, HAVE YOU WISHED YOU WERE DEAD OR WISHED YOU COULD GO TO SLEEP AND NOT WAKE UP?: NO

## 2025-05-01 ASSESSMENT — ACTIVITIES OF DAILY LIVING (ADL): ADLS_ACUITY_SCORE: 41

## 2025-05-01 NOTE — ED PROVIDER NOTES
[facility]  ED Provider Note    Chief Complaint   Patient presents with    Abdominal Pain     History:  Sudarshan Skelton is a 34 year old male with no relevant past medical history presents to the emergency department today complaining of left lower quadrant abdominal pain radiating down into his testicles.  The duration of the pain was perhaps an hour.  The symptoms have since abated and he is feeling much better now.  No other complaint    Review of Systems   Performed; see HPI for pertinent positives and negatives.     Medical history, surgical history, and social history was reviewed.  Nursing documentation, triage note, and vitals were reviewed.    Vitals:  BP: 132/90  Pulse: 68  Temp: (!) 96.5  F (35.8  C)  Resp: 16  SpO2: 93 %    Physical Exam:  Constitutional: Alert and conversant. NAD   HENT: NCAT   Eyes: Normal pupils   Neck: supple   CV: No pallor  Pulmonary/Chest: Non-labored respirations  Abdominal: non-distended soft, nontender no rebound no guarding  MSK: ROSARIO.  No CVA tenderness  Neuro: Alert and appropriate   Skin: Warm and dry. No diaphoresis. No rashes on exposed skin    Psych: Appropriate mood and affect       MDM:      ED Course as of 05/01/25 1815   Thu May 01, 2025   1813 Sudarshan Skelton is a 34 year old year old male presenting with left testicular pain. Differential includes nephrolithiasis, renal colic, pyelonephritis, urinary tract infection, hydronephrosis, thoracic or abdominal wall strain. Exam suggests a low likelihood of intra-abdominal solid organ, hollow viscus, or vascular pathology. Given concern for a source in the urinary tract, a urinalysis was obtained and revealed hematuria. With these findings and the patient's history, advanced imaging was indicated. Pain controlled in emergency department with out intervention. Will discharge with zofrrax. Urology referral given. He is stable for further outpatient management. Patient given instructions on follow-up and  warning signs for which to return to ED. All questions were answered and the patient is comfortable with plan for discharge. The patient was discharged in stable condition.        Procedures:  Procedures        Impression:  Final diagnoses:   Ureterolithiasis            Oliverio Ibarra MD  05/01/25 [id number]

## 2025-05-01 NOTE — DISCHARGE INSTRUCTIONS
Strain your urine.  Get your stone into the lab to be evaluated and follow-up with the urologist at your convenience to discuss the findings.  In the meantime any echo is of the pain that you have been going through you can treat with ibuprofen and Tylenol.  I will give you a prescription for Zofran in case it causes you nausea

## 2025-05-01 NOTE — ED TRIAGE NOTES
Pt presents while sitting at the HS and having some left sided testicular pain and up to left LLQ. No hx of kidney stones  no back pain

## 2025-05-05 ENCOUNTER — LAB (OUTPATIENT)
Dept: LAB | Facility: OTHER | Age: 35
End: 2025-05-05
Payer: COMMERCIAL

## 2025-05-05 DIAGNOSIS — N20.0 CALCULUS OF KIDNEY: ICD-10-CM

## 2025-05-06 ENCOUNTER — TELEPHONE (OUTPATIENT)
Dept: FAMILY MEDICINE | Facility: OTHER | Age: 35
End: 2025-05-06
Payer: COMMERCIAL

## 2025-05-06 DIAGNOSIS — N20.0 CALCULUS OF KIDNEY: Primary | ICD-10-CM

## 2025-05-06 NOTE — TELEPHONE ENCOUNTER
Patient dropped off a kidney stone to [facility] lab. Kassandra is wondering if there are orders that needed to be placed.     Nona Teixeira on 5/6/2025 at 8:13 AM

## 2025-05-12 LAB
APPEARANCE STONE: NORMAL
COMPN STONE: NORMAL
SPECIMEN WT: 8 MG

## 2025-05-14 ENCOUNTER — RESULTS FOLLOW-UP (OUTPATIENT)
Dept: FAMILY MEDICINE | Facility: OTHER | Age: 35
End: 2025-05-14

## 2025-07-24 ENCOUNTER — PATIENT OUTREACH (OUTPATIENT)
Dept: CARE COORDINATION | Facility: CLINIC | Age: 35
End: 2025-07-24
Payer: COMMERCIAL